# Patient Record
Sex: FEMALE | Race: WHITE | NOT HISPANIC OR LATINO | Employment: OTHER | URBAN - METROPOLITAN AREA
[De-identification: names, ages, dates, MRNs, and addresses within clinical notes are randomized per-mention and may not be internally consistent; named-entity substitution may affect disease eponyms.]

---

## 2023-03-14 DIAGNOSIS — K51.90 ULCERATIVE COLITIS WITHOUT COMPLICATIONS, UNSPECIFIED LOCATION (MULTI): ICD-10-CM

## 2023-03-14 PROBLEM — R79.89 ABNORMAL TSH: Status: ACTIVE | Noted: 2023-03-14

## 2023-03-14 PROBLEM — S60.211A CONTUSION OF RIGHT WRIST: Status: ACTIVE | Noted: 2023-03-14

## 2023-03-14 PROBLEM — M25.561 RIGHT KNEE PAIN: Status: ACTIVE | Noted: 2023-03-14

## 2023-03-14 PROBLEM — G54.2 CERVICAL NEUROPATHY: Status: ACTIVE | Noted: 2023-03-14

## 2023-03-14 PROBLEM — H81.10 BENIGN POSITIONAL VERTIGO: Status: ACTIVE | Noted: 2023-03-14

## 2023-03-14 PROBLEM — H52.00 HYPEROPIA: Status: ACTIVE | Noted: 2023-03-14

## 2023-03-14 PROBLEM — G47.33 OBSTRUCTIVE SLEEP APNEA: Status: ACTIVE | Noted: 2023-03-14

## 2023-03-14 PROBLEM — R60.9 EDEMA: Status: ACTIVE | Noted: 2023-03-14

## 2023-03-14 PROBLEM — F41.9 ANXIETY DISORDER: Status: ACTIVE | Noted: 2023-03-14

## 2023-03-14 PROBLEM — M25.461 EFFUSION OF RIGHT KNEE: Status: ACTIVE | Noted: 2023-03-14

## 2023-03-14 PROBLEM — H43.812 PVD (POSTERIOR VITREOUS DETACHMENT), LEFT EYE: Status: ACTIVE | Noted: 2023-03-14

## 2023-03-14 PROBLEM — E78.5 HYPERLIPEMIA: Status: ACTIVE | Noted: 2023-03-14

## 2023-03-14 PROBLEM — F33.9 MAJOR DEPRESSION, RECURRENT (CMS-HCC): Status: ACTIVE | Noted: 2023-03-14

## 2023-03-14 PROBLEM — Z96.652 STATUS POST REVISION OF TOTAL REPLACEMENT OF LEFT KNEE: Status: ACTIVE | Noted: 2023-03-14

## 2023-03-14 PROBLEM — H25.812 COMBINED FORM OF AGE-RELATED CATARACT, LEFT EYE: Status: ACTIVE | Noted: 2023-03-14

## 2023-03-14 PROBLEM — M54.50 LUMBAR PAIN: Status: ACTIVE | Noted: 2023-03-14

## 2023-03-14 PROBLEM — G43.909 MIGRAINE HEADACHE: Status: ACTIVE | Noted: 2023-03-14

## 2023-03-14 PROBLEM — M25.511 RIGHT SHOULDER PAIN: Status: ACTIVE | Noted: 2023-03-14

## 2023-03-14 PROBLEM — I67.1 UNRUPTURED CEREBRAL ANEURYSM (HHS-HCC): Status: ACTIVE | Noted: 2023-03-14

## 2023-03-14 PROBLEM — N18.30 STAGE 3 CHRONIC KIDNEY DISEASE (MULTI): Status: ACTIVE | Noted: 2023-03-14

## 2023-03-14 PROBLEM — E87.6 HYPOKALEMIA: Status: ACTIVE | Noted: 2023-03-14

## 2023-03-14 PROBLEM — R31.9 HEMATURIA: Status: ACTIVE | Noted: 2023-03-14

## 2023-03-14 PROBLEM — C67.2 MALIGNANT NEOPLASM OF LATERAL WALL OF URINARY BLADDER (MULTI): Status: ACTIVE | Noted: 2023-03-14

## 2023-03-14 PROBLEM — Z96.659 LOOSENING OF KNEE JOINT PROSTHESIS (CMS-HCC): Status: ACTIVE | Noted: 2023-03-14

## 2023-03-14 PROBLEM — H52.10 MYOPIA WITH PRESBYOPIA: Status: ACTIVE | Noted: 2023-03-14

## 2023-03-14 PROBLEM — M25.50 ARTHRALGIA OF MULTIPLE JOINTS: Status: ACTIVE | Noted: 2023-03-14

## 2023-03-14 PROBLEM — H69.93 DYSFUNCTION OF BOTH EUSTACHIAN TUBES: Status: ACTIVE | Noted: 2023-03-14

## 2023-03-14 PROBLEM — T84.59XA: Status: ACTIVE | Noted: 2023-03-14

## 2023-03-14 PROBLEM — H35.369 MACULAR DRUSEN: Status: ACTIVE | Noted: 2023-03-14

## 2023-03-14 PROBLEM — N39.0 ACUTE UTI: Status: ACTIVE | Noted: 2023-03-14

## 2023-03-14 PROBLEM — U09.9 POST-COVID-19 CONDITION: Status: ACTIVE | Noted: 2023-03-14

## 2023-03-14 PROBLEM — T14.8XXA SKIN AVULSION: Status: ACTIVE | Noted: 2023-03-14

## 2023-03-14 PROBLEM — M17.9 OSTEOARTHRITIS OF KNEE: Status: ACTIVE | Noted: 2023-03-14

## 2023-03-14 PROBLEM — I11.0: Status: ACTIVE | Noted: 2023-03-14

## 2023-03-14 PROBLEM — Z96.659: Status: ACTIVE | Noted: 2023-03-14

## 2023-03-14 PROBLEM — Z96.1 PSEUDOPHAKIA OF RIGHT EYE: Status: ACTIVE | Noted: 2023-03-14

## 2023-03-14 PROBLEM — M19.011 ARTHRITIS OF RIGHT SHOULDER REGION: Status: ACTIVE | Noted: 2023-03-14

## 2023-03-14 PROBLEM — H52.4 MYOPIA WITH PRESBYOPIA: Status: ACTIVE | Noted: 2023-03-14

## 2023-03-14 PROBLEM — B37.0 THRUSH: Status: ACTIVE | Noted: 2023-03-14

## 2023-03-14 PROBLEM — E03.9 ADULT HYPOTHYROIDISM: Status: ACTIVE | Noted: 2023-03-14

## 2023-03-14 PROBLEM — J43.9 COPD (CHRONIC OBSTRUCTIVE PULMONARY DISEASE) WITH EMPHYSEMA (MULTI): Status: ACTIVE | Noted: 2023-03-14

## 2023-03-14 PROBLEM — R53.83 FATIGUE: Status: ACTIVE | Noted: 2023-03-14

## 2023-03-14 PROBLEM — T84.038A LOOSENING OF KNEE JOINT PROSTHESIS (CMS-HCC): Status: ACTIVE | Noted: 2023-03-14

## 2023-03-14 RX ORDER — POTASSIUM CHLORIDE 750 MG/1
1 CAPSULE, EXTENDED RELEASE ORAL DAILY
COMMUNITY
Start: 2022-10-07 | End: 2023-07-18 | Stop reason: SDUPTHER

## 2023-03-14 RX ORDER — CHOLECALCIFEROL (VITAMIN D3) 25 MCG
1 TABLET ORAL DAILY
COMMUNITY

## 2023-03-14 RX ORDER — GELATIN ABSORBABLE SPONGE 12-7 MM 12-7 MM
1 MISC TOPICAL ONCE
COMMUNITY
Start: 2021-03-20 | End: 2023-10-02 | Stop reason: ALTCHOICE

## 2023-03-14 RX ORDER — CLOPIDOGREL BISULFATE 75 MG/1
1 TABLET ORAL DAILY
COMMUNITY
Start: 2018-06-19 | End: 2023-07-18 | Stop reason: SDUPTHER

## 2023-03-14 RX ORDER — SUMATRIPTAN SUCCINATE 100 MG/1
100 TABLET ORAL ONCE AS NEEDED
Status: ON HOLD | COMMUNITY
Start: 2020-05-07 | End: 2023-12-18 | Stop reason: ENTERED-IN-ERROR

## 2023-03-14 RX ORDER — LOPERAMIDE HCL 2 MG
2 TABLET ORAL AS NEEDED
COMMUNITY
Start: 2022-06-27

## 2023-03-14 RX ORDER — LEVOTHYROXINE SODIUM 25 UG/1
1 TABLET ORAL DAILY
COMMUNITY
Start: 2022-06-17 | End: 2023-07-18 | Stop reason: SDUPTHER

## 2023-03-14 RX ORDER — ATORVASTATIN CALCIUM 40 MG/1
1 TABLET, FILM COATED ORAL DAILY
COMMUNITY
Start: 2018-06-19 | End: 2023-07-18 | Stop reason: SDUPTHER

## 2023-03-14 RX ORDER — SULFASALAZINE 500 MG/1
500 TABLET ORAL 2 TIMES DAILY
Qty: 180 TABLET | Refills: 1 | Status: SHIPPED | OUTPATIENT
Start: 2023-03-14 | End: 2023-07-18 | Stop reason: SDUPTHER

## 2023-03-14 RX ORDER — FUROSEMIDE 40 MG/1
1 TABLET ORAL DAILY
COMMUNITY
Start: 2014-01-03 | End: 2023-07-18 | Stop reason: SDUPTHER

## 2023-03-14 RX ORDER — CITALOPRAM 20 MG/1
1 TABLET, FILM COATED ORAL DAILY
COMMUNITY
Start: 2013-10-24 | End: 2023-07-18 | Stop reason: SDUPTHER

## 2023-03-14 RX ORDER — BUPROPION HYDROCHLORIDE 300 MG/1
300 TABLET ORAL EVERY MORNING
COMMUNITY
Start: 2013-11-26 | End: 2023-07-18 | Stop reason: SDUPTHER

## 2023-03-14 RX ORDER — BUSPIRONE HYDROCHLORIDE 10 MG/1
1 TABLET ORAL 3 TIMES DAILY
Status: ON HOLD | COMMUNITY
Start: 2021-12-08 | End: 2023-12-18 | Stop reason: ENTERED-IN-ERROR

## 2023-03-14 RX ORDER — SULFASALAZINE 500 MG/1
500 TABLET ORAL 2 TIMES DAILY
COMMUNITY
End: 2023-03-14 | Stop reason: SDUPTHER

## 2023-06-28 ENCOUNTER — TELEPHONE (OUTPATIENT)
Dept: PRIMARY CARE | Facility: CLINIC | Age: 68
End: 2023-06-28

## 2023-07-18 ENCOUNTER — OFFICE VISIT (OUTPATIENT)
Dept: PRIMARY CARE | Facility: CLINIC | Age: 68
End: 2023-07-18
Payer: MEDICARE

## 2023-07-18 VITALS
HEIGHT: 66 IN | WEIGHT: 138 LBS | BODY MASS INDEX: 22.18 KG/M2 | OXYGEN SATURATION: 96 % | SYSTOLIC BLOOD PRESSURE: 120 MMHG | HEART RATE: 76 BPM | DIASTOLIC BLOOD PRESSURE: 80 MMHG

## 2023-07-18 DIAGNOSIS — F41.1 GENERALIZED ANXIETY DISORDER: ICD-10-CM

## 2023-07-18 DIAGNOSIS — N18.31 STAGE 3A CHRONIC KIDNEY DISEASE (MULTI): ICD-10-CM

## 2023-07-18 DIAGNOSIS — E55.9 VITAMIN D DEFICIENCY: ICD-10-CM

## 2023-07-18 DIAGNOSIS — E03.9 ADULT HYPOTHYROIDISM: ICD-10-CM

## 2023-07-18 DIAGNOSIS — K51.90 ULCERATIVE COLITIS WITHOUT COMPLICATIONS, UNSPECIFIED LOCATION (MULTI): ICD-10-CM

## 2023-07-18 DIAGNOSIS — J43.9 PULMONARY EMPHYSEMA, UNSPECIFIED EMPHYSEMA TYPE (MULTI): Primary | ICD-10-CM

## 2023-07-18 DIAGNOSIS — K51.819 OTHER ULCERATIVE COLITIS WITH COMPLICATION (MULTI): ICD-10-CM

## 2023-07-18 DIAGNOSIS — Z78.0 ASYMPTOMATIC MENOPAUSAL STATE: ICD-10-CM

## 2023-07-18 DIAGNOSIS — Z00.00 MEDICARE ANNUAL WELLNESS VISIT, SUBSEQUENT: ICD-10-CM

## 2023-07-18 DIAGNOSIS — Z23 NEED FOR VACCINATION: ICD-10-CM

## 2023-07-18 DIAGNOSIS — Z12.31 ENCOUNTER FOR SCREENING MAMMOGRAM FOR BREAST CANCER: ICD-10-CM

## 2023-07-18 DIAGNOSIS — E78.2 MIXED HYPERLIPIDEMIA: ICD-10-CM

## 2023-07-18 DIAGNOSIS — I11.0 HYPERTENSIVE HEART DISEASE WITH CONGESTIVE HEART FAILURE, UNSPECIFIED HEART FAILURE TYPE (MULTI): ICD-10-CM

## 2023-07-18 DIAGNOSIS — F33.41 RECURRENT MAJOR DEPRESSIVE DISORDER, IN PARTIAL REMISSION (CMS-HCC): ICD-10-CM

## 2023-07-18 PROCEDURE — 4004F PT TOBACCO SCREEN RCVD TLK: CPT | Performed by: FAMILY MEDICINE

## 2023-07-18 PROCEDURE — 1159F MED LIST DOCD IN RCRD: CPT | Performed by: FAMILY MEDICINE

## 2023-07-18 PROCEDURE — 1170F FXNL STATUS ASSESSED: CPT | Performed by: FAMILY MEDICINE

## 2023-07-18 PROCEDURE — 99214 OFFICE O/P EST MOD 30 MIN: CPT | Performed by: FAMILY MEDICINE

## 2023-07-18 PROCEDURE — 1125F AMNT PAIN NOTED PAIN PRSNT: CPT | Performed by: FAMILY MEDICINE

## 2023-07-18 PROCEDURE — 1160F RVW MEDS BY RX/DR IN RCRD: CPT | Performed by: FAMILY MEDICINE

## 2023-07-18 RX ORDER — CITALOPRAM 20 MG/1
20 TABLET, FILM COATED ORAL DAILY
Qty: 90 TABLET | Refills: 3 | Status: SHIPPED | OUTPATIENT
Start: 2023-07-18 | End: 2023-08-08

## 2023-07-18 RX ORDER — LEVOTHYROXINE SODIUM 25 UG/1
25 TABLET ORAL DAILY
Qty: 90 TABLET | Refills: 1 | Status: SHIPPED | OUTPATIENT
Start: 2023-07-18 | End: 2024-03-15 | Stop reason: SDUPTHER

## 2023-07-18 RX ORDER — FUROSEMIDE 40 MG/1
40 TABLET ORAL DAILY
Qty: 90 TABLET | Refills: 1 | Status: SHIPPED | OUTPATIENT
Start: 2023-07-18 | End: 2023-09-12 | Stop reason: DRUGHIGH

## 2023-07-18 RX ORDER — BUPROPION HYDROCHLORIDE 300 MG/1
300 TABLET ORAL EVERY MORNING
Qty: 90 TABLET | Refills: 3 | Status: SHIPPED | OUTPATIENT
Start: 2023-07-18

## 2023-07-18 RX ORDER — SULFASALAZINE 500 MG/1
500 TABLET ORAL 2 TIMES DAILY
Qty: 180 TABLET | Refills: 1 | Status: SHIPPED | OUTPATIENT
Start: 2023-07-18 | End: 2023-10-02 | Stop reason: SDUPTHER

## 2023-07-18 RX ORDER — CLOPIDOGREL BISULFATE 75 MG/1
75 TABLET ORAL DAILY
Qty: 90 TABLET | Refills: 1 | Status: SHIPPED | OUTPATIENT
Start: 2023-07-18 | End: 2024-03-15 | Stop reason: SDUPTHER

## 2023-07-18 RX ORDER — POTASSIUM CHLORIDE 750 MG/1
10 CAPSULE, EXTENDED RELEASE ORAL DAILY
Qty: 90 CAPSULE | Refills: 1 | Status: SHIPPED | OUTPATIENT
Start: 2023-07-18 | End: 2023-09-12 | Stop reason: DRUGHIGH

## 2023-07-18 RX ORDER — ATORVASTATIN CALCIUM 40 MG/1
40 TABLET, FILM COATED ORAL DAILY
Qty: 90 TABLET | Refills: 3 | Status: SHIPPED | OUTPATIENT
Start: 2023-07-18

## 2023-07-18 ASSESSMENT — ANXIETY QUESTIONNAIRES
1. FEELING NERVOUS, ANXIOUS, OR ON EDGE: MORE THAN HALF THE DAYS
GAD7 TOTAL SCORE: 16
7. FEELING AFRAID AS IF SOMETHING AWFUL MIGHT HAPPEN: MORE THAN HALF THE DAYS
2. NOT BEING ABLE TO STOP OR CONTROL WORRYING: SEVERAL DAYS
6. BECOMING EASILY ANNOYED OR IRRITABLE: NEARLY EVERY DAY
3. WORRYING TOO MUCH ABOUT DIFFERENT THINGS: NEARLY EVERY DAY
4. TROUBLE RELAXING: NEARLY EVERY DAY
5. BEING SO RESTLESS THAT IT IS HARD TO SIT STILL: MORE THAN HALF THE DAYS
IF YOU CHECKED OFF ANY PROBLEMS ON THIS QUESTIONNAIRE, HOW DIFFICULT HAVE THESE PROBLEMS MADE IT FOR YOU TO DO YOUR WORK, TAKE CARE OF THINGS AT HOME, OR GET ALONG WITH OTHER PEOPLE: VERY DIFFICULT

## 2023-07-18 ASSESSMENT — PATIENT HEALTH QUESTIONNAIRE - PHQ9
9. THOUGHTS THAT YOU WOULD BE BETTER OFF DEAD, OR OF HURTING YOURSELF: NOT AT ALL
6. FEELING BAD ABOUT YOURSELF - OR THAT YOU ARE A FAILURE OR HAVE LET YOURSELF OR YOUR FAMILY DOWN: MORE THAN HALF THE DAYS
SUM OF ALL RESPONSES TO PHQ QUESTIONS 1-9: 17
2. FEELING DOWN, DEPRESSED OR HOPELESS: NEARLY EVERY DAY
SUM OF ALL RESPONSES TO PHQ9 QUESTIONS 1 AND 2: 5
7. TROUBLE CONCENTRATING ON THINGS, SUCH AS READING THE NEWSPAPER OR WATCHING TELEVISION: NEARLY EVERY DAY
8. MOVING OR SPEAKING SO SLOWLY THAT OTHER PEOPLE COULD HAVE NOTICED. OR THE OPPOSITE, BEING SO FIGETY OR RESTLESS THAT YOU HAVE BEEN MOVING AROUND A LOT MORE THAN USUAL: NOT AT ALL
10. IF YOU CHECKED OFF ANY PROBLEMS, HOW DIFFICULT HAVE THESE PROBLEMS MADE IT FOR YOU TO DO YOUR WORK, TAKE CARE OF THINGS AT HOME, OR GET ALONG WITH OTHER PEOPLE: SOMEWHAT DIFFICULT
4. FEELING TIRED OR HAVING LITTLE ENERGY: NEARLY EVERY DAY
5. POOR APPETITE OR OVEREATING: MORE THAN HALF THE DAYS
1. LITTLE INTEREST OR PLEASURE IN DOING THINGS: MORE THAN HALF THE DAYS
3. TROUBLE FALLING OR STAYING ASLEEP OR SLEEPING TOO MUCH: MORE THAN HALF THE DAYS

## 2023-07-18 ASSESSMENT — ACTIVITIES OF DAILY LIVING (ADL)
DRESSING: INDEPENDENT
GROCERY_SHOPPING: INDEPENDENT
DOING_HOUSEWORK: INDEPENDENT
MANAGING_FINANCES: INDEPENDENT
DOING_HOUSEWORK: INDEPENDENT
TAKING_MEDICATION: INDEPENDENT
TAKING_MEDICATION: INDEPENDENT
DRESSING: INDEPENDENT
MANAGING_FINANCES: INDEPENDENT
BATHING: INDEPENDENT
GROCERY_SHOPPING: INDEPENDENT
BATHING: INDEPENDENT

## 2023-07-18 ASSESSMENT — ENCOUNTER SYMPTOMS
LOSS OF SENSATION IN FEET: 0
OCCASIONAL FEELINGS OF UNSTEADINESS: 0
DEPRESSION: 0

## 2023-07-18 NOTE — PROGRESS NOTES
"Subjective   Reason for Visit: Julissa New is an 67 y.o. female here for a Medicare Wellness visit.     Past Medical, Surgical, and Family History reviewed and updated in chart.    Reviewed all medications by prescribing practitioner or clinical pharmacist (such as prescriptions, OTCs, herbal therapies and supplements) and documented in the medical record.    HPI    SOC Hx:   Lives with her sister and great neice  Tobacco Use: High Risk (7/18/2023)    Patient History     Smoking Tobacco Use: Every Day     Smokeless Tobacco Use: Never     Passive Exposure: Not on file     Alcohol Use: Not on file       DIET: general    EXERCISE: The patient does not participate in regular exercise at present.    ELIMINATION: Bowel urgency fluctuating difficulty getting to the bathroom  Not  black or tarry    No urinary issuesnone    SLEEP: no issues disturbed wakes frequently checking on her teenage neice    MOODS: stress manageable despite family stressors  PHQ-9: Patient Health Questionnaire-9 Score: 17    DICK-7: DICK-7 Total Score: 16     OB/GYN: LMP: No LMP recorded.  Menopause for many years  Hot flashes       Patient Care Team:  Yarely Mitchell DO as PCP - General  Yarely Mitchell DO as PCP - MSSP ACO Attributed Provider     Review of Systems    Objective   Vitals:  /80 (BP Location: Left arm, Patient Position: Sitting, BP Cuff Size: Adult)   Pulse 76   Ht 1.676 m (5' 6\")   Wt 62.6 kg (138 lb)   SpO2 96%   BMI 22.27 kg/m²       Physical Exam  Constitutional:       General: She is not in acute distress.     Appearance: Normal appearance. She is not ill-appearing.   HENT:      Head: Normocephalic and atraumatic.   Neck:      Vascular: No carotid bruit.   Cardiovascular:      Rate and Rhythm: Normal rate and regular rhythm.      Pulses: Normal pulses.      Heart sounds: Normal heart sounds. No murmur heard.     No gallop.   Pulmonary:      Effort: Pulmonary effort is normal.      Breath sounds: Normal breath " sounds. No wheezing, rhonchi or rales.   Musculoskeletal:      Cervical back: Normal range of motion and neck supple. No rigidity or tenderness.   Lymphadenopathy:      Cervical: No cervical adenopathy.   Skin:     General: Skin is warm and dry.   Neurological:      Mental Status: She is alert.   Psychiatric:         Mood and Affect: Mood normal.         Behavior: Behavior normal.         Assessment/Plan   Problem List Items Addressed This Visit       Adult hypothyroidism    Current Assessment & Plan     Stable on current Levothyroxine dosage.  Continue at current dosage.         Relevant Medications    levothyroxine (LevoxyL) 25 mcg tablet    Other Relevant Orders    TSH with reflex to Free T4 if abnormal    Anxiety disorder    Relevant Medications    citalopram (CeleXA) 20 mg tablet    buPROPion XL (Wellbutrin XL) 300 mg 24 hr tablet    COPD (chronic obstructive pulmonary disease) with emphysema (CMS/HCC)    Hyperlipemia    Current Assessment & Plan     Stable - continue with medications at current dosage.         Relevant Medications    atorvastatin (Lipitor) 40 mg tablet    Other Relevant Orders    Comprehensive Metabolic Panel    Lipid Panel    Hypertensive CHF (congestive heart failure) (CMS/HCC)    Current Assessment & Plan     Stable - weight loss continues but no SOB or chest pain.  Continue with current medications at current dosage.  Follow at least every 6 months.         Relevant Medications    clopidogrel (Plavix) 75 mg tablet    potassium chloride ER (Micro-K) 10 mEq ER capsule    furosemide (Lasix) 40 mg tablet    Other Relevant Orders    CBC    Comprehensive Metabolic Panel    Major depression, recurrent (CMS/HCC)    Current Assessment & Plan     Concerning exacerbation of major depression  Encouraging to look into senior center activities and /or Holiness groups with her local parish.  Discussed behavioral collaborative care referral.         Relevant Orders    Follow Up In Advanced Primary Care -  Behavioral Health Collaborative Care CoCM    Stage 3 chronic kidney disease (CMS/[facility])    Current Assessment & Plan     Rechecking labs  Avoid NSAIDs as much as possible, dietary changes discussed.         Relevant Orders    Comprehensive Metabolic Panel    Ulcerative colitis (CMS/[facility])    Current Assessment & Plan     Flared - increasing Sulfasalazine dosage.  Continue with dietary modifications.  May need follow up with GI if not improving.         Relevant Medications    sulfaSALAzine (Azulfidine) 500 mg tablet     Other Visit Diagnoses       Need for vaccination    -  Primary    Relevant Medications    zoster vaccine-recombinant adjuvanted (Shingrix) 50 mcg/0.5 mL vaccine    Asymptomatic menopausal state        Relevant Orders    XR DEXA bone density    Encounter for screening mammogram for breast cancer        Relevant Orders    BI mammo bilateral screening tomosynthesis    Medicare annual wellness visit, subsequent        Vitamin D deficiency        Relevant Orders    Vitamin D, Total          I have discussed the collaborative care model for this patient's behavioral health care.  Written detailed information and identifying the members of this care team was provided to patient.  They give permission for the Behavioral Health Manager (BHM) and psychiatric consultant to be included in their care with my continued primary management.  Patient made aware that services provided as part of the Collaborative Care Model are subject to insurance billing.        Patient was identified as a fall risk. Risk prevention instructions provided.    Medicare Wellness Billing Compliance Satisfied    *This is a visual tool to show completion of required items on the day of the visit. Green checks will only appear on the date of visit.    Review all medications by prescribing practitioner or clinical pharmacist (such as prescriptions, OTCs, herbal therapies and supplements) documented in the medical record    Past Medical,  Surgical, and Family History reviewed and updated in chart    Tobacco Use Reviewed    Alcohol Use Reviewed    Illicit Drug Use Reviewed    PHQ2/9    Falls in Last Year Reviewed    Home Safety Risk Factors Reviewed    Cognitive Impairment Reviewed    Patient Self Assessment and Health Status    Current Diet Reviewed    Exercise Frequency    ADL - Hearing Impairment    ADL - Bathing    ADL - Dressing    ADL - Walks in Home    IADL - Managing Finances    IADL - Grocery Shopping    IADL - Taking Medications    IADL - Doing Housework      FOLLOW U P 3-4 MONTHS PENDING REVIEW OF LABS AND COLLABORATIVE CARE TEAM.

## 2023-07-19 NOTE — ASSESSMENT & PLAN NOTE
Flared - increasing Sulfasalazine dosage.  Continue with dietary modifications.  May need follow up with GI if not improving.

## 2023-07-19 NOTE — ASSESSMENT & PLAN NOTE
Stable - weight loss continues but no SOB or chest pain.  Continue with current medications at current dosage.  Follow at least every 6 months.

## 2023-07-19 NOTE — ASSESSMENT & PLAN NOTE
Concerning exacerbation of major depression  Encouraging to look into senior center activities and /or Holiness groups with her local parish.  Discussed behavioral collaborative care referral.

## 2023-08-02 ENCOUNTER — SOCIAL WORK (OUTPATIENT)
Dept: PRIMARY CARE | Facility: CLINIC | Age: 68
End: 2023-08-02
Payer: MEDICARE

## 2023-08-02 ENCOUNTER — DOCUMENTATION (OUTPATIENT)
Dept: BEHAVIORAL HEALTH | Facility: CLINIC | Age: 68
End: 2023-08-02

## 2023-08-02 DIAGNOSIS — F33.41 RECURRENT MAJOR DEPRESSIVE DISORDER, IN PARTIAL REMISSION (CMS-HCC): ICD-10-CM

## 2023-08-02 ASSESSMENT — PATIENT HEALTH QUESTIONNAIRE - PHQ9
1. LITTLE INTEREST OR PLEASURE IN DOING THINGS: MORE THAN HALF THE DAYS
10. IF YOU CHECKED OFF ANY PROBLEMS, HOW DIFFICULT HAVE THESE PROBLEMS MADE IT FOR YOU TO DO YOUR WORK, TAKE CARE OF THINGS AT HOME, OR GET ALONG WITH OTHER PEOPLE: SOMEWHAT DIFFICULT
2. FEELING DOWN, DEPRESSED OR HOPELESS: NEARLY EVERY DAY
4. FEELING TIRED OR HAVING LITTLE ENERGY: NEARLY EVERY DAY
8. MOVING OR SPEAKING SO SLOWLY THAT OTHER PEOPLE COULD HAVE NOTICED. OR THE OPPOSITE, BEING SO FIGETY OR RESTLESS THAT YOU HAVE BEEN MOVING AROUND A LOT MORE THAN USUAL: MORE THAN HALF THE DAYS
SUM OF ALL RESPONSES TO PHQ9 QUESTIONS 1 & 2: 5
3. TROUBLE FALLING OR STAYING ASLEEP: SEVERAL DAYS
6. FEELING BAD ABOUT YOURSELF - OR THAT YOU ARE A FAILURE OR HAVE LET YOURSELF OR YOUR FAMILY DOWN: NOT AT ALL
5. POOR APPETITE OR OVEREATING: SEVERAL DAYS
9. THOUGHTS THAT YOU WOULD BE BETTER OFF DEAD, OR OF HURTING YOURSELF: NOT AT ALL
SUM OF ALL RESPONSES TO PHQ QUESTIONS 1-9: 14
7. TROUBLE CONCENTRATING ON THINGS, SUCH AS READING THE NEWSPAPER OR WATCHING TELEVISION: MORE THAN HALF THE DAYS
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
## 2023-08-02 NOTE — Clinical Note
NHAN regarding my recommendations based on our team's discussion in collaborative care; in brief I'd suggest switching her from citalopram 20mg to escitalopram 10mg. I'd also recommend checking to see if there is anything addition to do to optimize management of her ulcerative colitis and VERO (given their overlap with her psychiatric symptoms) and see if she is interested in help with tobacco cessation.   Please feel free to reach out with any questions / comments / feedback / concerns.   Davidson Forbes

## 2023-08-03 ENCOUNTER — TELEPHONE (OUTPATIENT)
Dept: PRIMARY CARE | Facility: CLINIC | Age: 68
End: 2023-08-03
Payer: MEDICARE
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
## 2023-08-08 ENCOUNTER — TELEPHONE (OUTPATIENT)
Dept: PRIMARY CARE | Facility: CLINIC | Age: 68
End: 2023-08-08

## 2023-08-08 ENCOUNTER — SOCIAL WORK (OUTPATIENT)
Dept: PRIMARY CARE | Facility: CLINIC | Age: 68
End: 2023-08-08
Payer: MEDICARE

## 2023-08-08 DIAGNOSIS — F41.9 ANXIETY DISORDER, UNSPECIFIED TYPE: Primary | ICD-10-CM

## 2023-08-08 DIAGNOSIS — F33.2 SEVERE EPISODE OF RECURRENT MAJOR DEPRESSIVE DISORDER, WITHOUT PSYCHOTIC FEATURES (MULTI): ICD-10-CM

## 2023-08-08 RX ORDER — ESCITALOPRAM OXALATE 10 MG/1
10 TABLET ORAL DAILY
Qty: 30 TABLET | Refills: 1 | Status: SHIPPED | OUTPATIENT
Start: 2023-08-08 | End: 2023-12-27

## 2023-08-08 ASSESSMENT — ANXIETY QUESTIONNAIRES
4. TROUBLE RELAXING: NEARLY EVERY DAY
1. FEELING NERVOUS, ANXIOUS, OR ON EDGE: NEARLY EVERY DAY
3. WORRYING TOO MUCH ABOUT DIFFERENT THINGS: SEVERAL DAYS
7. FEELING AFRAID AS IF SOMETHING AWFUL MIGHT HAPPEN: NOT AT ALL
IF YOU CHECKED OFF ANY PROBLEMS ON THIS QUESTIONNAIRE, HOW DIFFICULT HAVE THESE PROBLEMS MADE IT FOR YOU TO DO YOUR WORK, TAKE CARE OF THINGS AT HOME, OR GET ALONG WITH OTHER PEOPLE: SOMEWHAT DIFFICULT
5. BEING SO RESTLESS THAT IT IS HARD TO SIT STILL: SEVERAL DAYS
2. NOT BEING ABLE TO STOP OR CONTROL WORRYING: MORE THAN HALF THE DAYS
6. BECOMING EASILY ANNOYED OR IRRITABLE: NEARLY EVERY DAY
GAD7 TOTAL SCORE: 13

## 2023-08-08 ASSESSMENT — PATIENT HEALTH QUESTIONNAIRE - PHQ9
6. FEELING BAD ABOUT YOURSELF - OR THAT YOU ARE A FAILURE OR HAVE LET YOURSELF OR YOUR FAMILY DOWN: NOT AT ALL
2. FEELING DOWN, DEPRESSED OR HOPELESS: MORE THAN HALF THE DAYS
5. POOR APPETITE OR OVEREATING: MORE THAN HALF THE DAYS
10. IF YOU CHECKED OFF ANY PROBLEMS, HOW DIFFICULT HAVE THESE PROBLEMS MADE IT FOR YOU TO DO YOUR WORK, TAKE CARE OF THINGS AT HOME, OR GET ALONG WITH OTHER PEOPLE: NOT DIFFICULT AT ALL
SUM OF ALL RESPONSES TO PHQ QUESTIONS 1-9: 16
3. TROUBLE FALLING OR STAYING ASLEEP: MORE THAN HALF THE DAYS
9. THOUGHTS THAT YOU WOULD BE BETTER OFF DEAD, OR OF HURTING YOURSELF: NOT AT ALL
4. FEELING TIRED OR HAVING LITTLE ENERGY: NEARLY EVERY DAY
7. TROUBLE CONCENTRATING ON THINGS, SUCH AS READING THE NEWSPAPER OR WATCHING TELEVISION: MORE THAN HALF THE DAYS
1. LITTLE INTEREST OR PLEASURE IN DOING THINGS: NEARLY EVERY DAY
8. MOVING OR SPEAKING SO SLOWLY THAT OTHER PEOPLE COULD HAVE NOTICED. OR THE OPPOSITE, BEING SO FIGETY OR RESTLESS THAT YOU HAVE BEEN MOVING AROUND A LOT MORE THAN USUAL: MORE THAN HALF THE DAYS
SUM OF ALL RESPONSES TO PHQ9 QUESTIONS 1 & 2: 5

## 2023-08-08 NOTE — PROGRESS NOTES
I met with Lisa today to go over Dr. Bautista's recommendations.  She is willing to try switching from citalopram to escitalopram 10mg per Dr. Bautista's note.  Do you want to see her prior to switching or is it ok to start and then have her follow up with you?

## 2023-08-08 NOTE — PROGRESS NOTES
Collaborative Care (CoCM)  Progress Note    Type of Interaction: In Office    Start Time: 3:02 PM    End Time: 3:30 PM    Appointment: Scheduled    Reason for Visit:   Chief Complaint   Patient presents with    Follow-up      Interval History / Patient Symptoms:     Patient Health Questionnaire-9 Score: 16 (8/8/2023  3:30 PM)  DICK-7 Total Score: 13 (8/8/2023  3:24 PM)        Interventions Provided: Treatment Planning      Progress Made: N/A    Response to Intervention: Reports she cannot taste or smell since having COVID, but does not have any other long lasting symptoms or concerns.    Reviewed psych recommendations, including changing citalopram to escitalopram.  Patient is agreeable to switching to escitalopram 10mg.  Side effects reviewed.  Reviewed patient's expectations of Collaborative care, including goals.  Patient states she feels she handles anger well and can see situations in grey.  Will explore goals more during next appointment.        Plan:   M to communicate with PCP regarding medication recommendations   BHM to communicate with patient regarding prescription and making a follow up with PCP after 1 month.      Follow Up / Next Appointment: Next appointment: 08/22/23

## 2023-08-22 ENCOUNTER — APPOINTMENT (OUTPATIENT)
Dept: PRIMARY CARE | Facility: CLINIC | Age: 68
End: 2023-08-22
Payer: MEDICARE

## 2023-08-29 RX ORDER — NAPROXEN 500 MG/1
500 TABLET ORAL 2 TIMES DAILY PRN
COMMUNITY

## 2023-09-05 ENCOUNTER — DOCUMENTATION (OUTPATIENT)
Dept: PRIMARY CARE | Facility: CLINIC | Age: 68
End: 2023-09-05
Payer: MEDICARE

## 2023-09-05 DIAGNOSIS — F33.1 MODERATE EPISODE OF RECURRENT MAJOR DEPRESSIVE DISORDER (MULTI): Primary | ICD-10-CM

## 2023-09-05 PROCEDURE — 99494 1ST/SBSQ PSYC COLLAB CARE: CPT | Performed by: FAMILY MEDICINE

## 2023-09-05 PROCEDURE — 99492 1ST PSYC COLLAB CARE MGMT: CPT | Performed by: FAMILY MEDICINE

## 2023-09-12 ENCOUNTER — OFFICE VISIT (OUTPATIENT)
Dept: PRIMARY CARE | Facility: CLINIC | Age: 68
End: 2023-09-12
Payer: COMMERCIAL

## 2023-09-12 VITALS
HEART RATE: 61 BPM | BODY MASS INDEX: 21.56 KG/M2 | OXYGEN SATURATION: 95 % | RESPIRATION RATE: 16 BRPM | SYSTOLIC BLOOD PRESSURE: 110 MMHG | WEIGHT: 133.6 LBS | DIASTOLIC BLOOD PRESSURE: 64 MMHG

## 2023-09-12 DIAGNOSIS — Z23 NEED FOR VACCINATION: ICD-10-CM

## 2023-09-12 DIAGNOSIS — Z12.11 COLON CANCER SCREENING: ICD-10-CM

## 2023-09-12 DIAGNOSIS — I11.0 HYPERTENSIVE HEART DISEASE WITH CONGESTIVE HEART FAILURE, UNSPECIFIED HEART FAILURE TYPE (MULTI): ICD-10-CM

## 2023-09-12 DIAGNOSIS — R91.8 LUNG NODULES: ICD-10-CM

## 2023-09-12 DIAGNOSIS — Z00.00 MEDICARE ANNUAL WELLNESS VISIT, SUBSEQUENT: Primary | ICD-10-CM

## 2023-09-12 DIAGNOSIS — K51.819 OTHER ULCERATIVE COLITIS WITH COMPLICATION (MULTI): ICD-10-CM

## 2023-09-12 DIAGNOSIS — N18.31 STAGE 3A CHRONIC KIDNEY DISEASE (MULTI): ICD-10-CM

## 2023-09-12 DIAGNOSIS — C67.2 MALIGNANT NEOPLASM OF LATERAL WALL OF URINARY BLADDER (MULTI): ICD-10-CM

## 2023-09-12 DIAGNOSIS — I67.1 UNRUPTURED CEREBRAL ANEURYSM (HHS-HCC): ICD-10-CM

## 2023-09-12 PROCEDURE — 4004F PT TOBACCO SCREEN RCVD TLK: CPT | Performed by: FAMILY MEDICINE

## 2023-09-12 PROCEDURE — G0439 PPPS, SUBSEQ VISIT: HCPCS | Performed by: FAMILY MEDICINE

## 2023-09-12 PROCEDURE — 1160F RVW MEDS BY RX/DR IN RCRD: CPT | Performed by: FAMILY MEDICINE

## 2023-09-12 PROCEDURE — 1125F AMNT PAIN NOTED PAIN PRSNT: CPT | Performed by: FAMILY MEDICINE

## 2023-09-12 PROCEDURE — 1159F MED LIST DOCD IN RCRD: CPT | Performed by: FAMILY MEDICINE

## 2023-09-12 PROCEDURE — 99214 OFFICE O/P EST MOD 30 MIN: CPT | Performed by: FAMILY MEDICINE

## 2023-09-12 ASSESSMENT — PATIENT HEALTH QUESTIONNAIRE - PHQ9
5. POOR APPETITE OR OVEREATING: MORE THAN HALF THE DAYS
10. IF YOU CHECKED OFF ANY PROBLEMS, HOW DIFFICULT HAVE THESE PROBLEMS MADE IT FOR YOU TO DO YOUR WORK, TAKE CARE OF THINGS AT HOME, OR GET ALONG WITH OTHER PEOPLE: SOMEWHAT DIFFICULT
6. FEELING BAD ABOUT YOURSELF - OR THAT YOU ARE A FAILURE OR HAVE LET YOURSELF OR YOUR FAMILY DOWN: NOT AT ALL
8. MOVING OR SPEAKING SO SLOWLY THAT OTHER PEOPLE COULD HAVE NOTICED. OR THE OPPOSITE, BEING SO FIGETY OR RESTLESS THAT YOU HAVE BEEN MOVING AROUND A LOT MORE THAN USUAL: MORE THAN HALF THE DAYS
1. LITTLE INTEREST OR PLEASURE IN DOING THINGS: SEVERAL DAYS
7. TROUBLE CONCENTRATING ON THINGS, SUCH AS READING THE NEWSPAPER OR WATCHING TELEVISION: MORE THAN HALF THE DAYS
SUM OF ALL RESPONSES TO PHQ9 QUESTIONS 1 AND 2: 3
2. FEELING DOWN, DEPRESSED OR HOPELESS: MORE THAN HALF THE DAYS
9. THOUGHTS THAT YOU WOULD BE BETTER OFF DEAD, OR OF HURTING YOURSELF: NOT AT ALL
3. TROUBLE FALLING OR STAYING ASLEEP OR SLEEPING TOO MUCH: SEVERAL DAYS
SUM OF ALL RESPONSES TO PHQ QUESTIONS 1-9: 11
4. FEELING TIRED OR HAVING LITTLE ENERGY: SEVERAL DAYS

## 2023-09-12 ASSESSMENT — LIFESTYLE VARIABLES
HOW OFTEN DURING THE LAST YEAR HAVE YOU HAD A FEELING OF GUILT OR REMORSE AFTER DRINKING: MONTHLY
AUDIT-C TOTAL SCORE: 3
HAS A RELATIVE, FRIEND, DOCTOR, OR ANOTHER HEALTH PROFESSIONAL EXPRESSED CONCERN ABOUT YOUR DRINKING OR SUGGESTED YOU CUT DOWN: YES, DURING THE LAST YEAR
HOW OFTEN DURING THE LAST YEAR HAVE YOU BEEN UNABLE TO REMEMBER WHAT HAPPENED THE NIGHT BEFORE BECAUSE YOU HAD BEEN DRINKING: LESS THAN MONTHLY
HOW OFTEN DO YOU HAVE SIX OR MORE DRINKS ON ONE OCCASION: LESS THAN MONTHLY
HOW MANY STANDARD DRINKS CONTAINING ALCOHOL DO YOU HAVE ON A TYPICAL DAY: 1 OR 2
HOW OFTEN DURING THE LAST YEAR HAVE YOU FOUND THAT YOU WERE NOT ABLE TO STOP DRINKING ONCE YOU HAD STARTED: MONTHLY
SKIP TO QUESTIONS 9-10: 0
HOW OFTEN DURING THE LAST YEAR HAVE YOU NEEDED AN ALCOHOLIC DRINK FIRST THING IN THE MORNING TO GET YOURSELF GOING AFTER A NIGHT OF HEAVY DRINKING: NEVER
HOW OFTEN DO YOU HAVE A DRINK CONTAINING ALCOHOL: 2-4 TIMES A MONTH
HOW OFTEN DURING THE LAST YEAR HAVE YOU FAILED TO DO WHAT WAS NORMALLY EXPECTED FROM YOU BECAUSE OF DRINKING: MONTHLY
AUDIT TOTAL SCORE: 18
HAVE YOU OR SOMEONE ELSE BEEN INJURED AS A RESULT OF YOUR DRINKING: YES, DURING THE LAST YEAR

## 2023-09-12 ASSESSMENT — ANXIETY QUESTIONNAIRES
2. NOT BEING ABLE TO STOP OR CONTROL WORRYING: SEVERAL DAYS
5. BEING SO RESTLESS THAT IT IS HARD TO SIT STILL: NOT AT ALL
7. FEELING AFRAID AS IF SOMETHING AWFUL MIGHT HAPPEN: MORE THAN HALF THE DAYS
3. WORRYING TOO MUCH ABOUT DIFFERENT THINGS: NEARLY EVERY DAY
4. TROUBLE RELAXING: SEVERAL DAYS
1. FEELING NERVOUS, ANXIOUS, OR ON EDGE: MORE THAN HALF THE DAYS
6. BECOMING EASILY ANNOYED OR IRRITABLE: NEARLY EVERY DAY
GAD7 TOTAL SCORE: 12
IF YOU CHECKED OFF ANY PROBLEMS ON THIS QUESTIONNAIRE, HOW DIFFICULT HAVE THESE PROBLEMS MADE IT FOR YOU TO DO YOUR WORK, TAKE CARE OF THINGS AT HOME, OR GET ALONG WITH OTHER PEOPLE: SOMEWHAT DIFFICULT

## 2023-09-12 NOTE — PROGRESS NOTES
Subjective   Reason for Visit: Julissa New is an 67 y.o. female here for a Medicare Wellness visit.     Past Medical, Surgical, and Family History reviewed and updated in chart.    HTN: BP  controlled  Somewhat lightheaded with changes in position    Reviewed all medications by prescribing practitioner or clinical pharmacist (such as prescriptions, OTCs, herbal therapies and supplements) and documented in the medical record.    HPI    SOC Hx:   Tobacco Use: High Risk (9/12/2023)    Patient History     Smoking Tobacco Use: Every Day     Smokeless Tobacco Use: Never     Passive Exposure: Not on file     Alcohol Use: Heavy Drinker (9/12/2023)    AUDIT-C     Frequency of Alcohol Consumption: 2-4 times a month     Average Number of Drinks: 1 or 2     Frequency of Binge Drinking: Less than monthly       DIET: general    EXERCISE: The patient does not participate in regular exercise at present.    ELIMINATION: no constipation or diarrhea    No urinary issuesnone    SLEEP: no issues disturbed    MOODS: better on her current regimen  PHQ-9:  3  DICK-7: DICK-7 Total Score: 12 12    OB/GYN: LMP: No LMP recorded.  Menopause at 51 years  Last pap date: unknown  Abnormal pap? no    Patient Care Team:  Yarely Mitchell DO as PCP - General (Family Medicine)  Yarely Mitchell DO (Family Medicine)     Review of Systems    Objective   Vitals:  /64 (BP Location: Left arm, Patient Position: Sitting, BP Cuff Size: Adult)   Pulse 61   Resp 16   Wt 60.6 kg (133 lb 9.6 oz)   SpO2 95%   BMI 21.56 kg/m²       Physical Exam  Constitutional:       General: She is not in acute distress.     Comments: Cachetic appearing   HENT:      Head: Normocephalic.      Right Ear: Tympanic membrane normal.      Left Ear: Tympanic membrane normal.      Nose: Nose normal. No congestion or rhinorrhea.      Mouth/Throat:      Mouth: Mucous membranes are dry.   Eyes:      Extraocular Movements: Extraocular movements intact.      Pupils: Pupils  are equal, round, and reactive to light.   Neck:      Vascular: No carotid bruit.   Cardiovascular:      Rate and Rhythm: Normal rate and regular rhythm.      Pulses: Normal pulses.      Heart sounds: Normal heart sounds. No murmur heard.     No friction rub.   Pulmonary:      Effort: Pulmonary effort is normal.      Breath sounds: Normal breath sounds.   Abdominal:      General: Abdomen is flat. Bowel sounds are normal.   Musculoskeletal:         General: Normal range of motion.      Cervical back: Normal range of motion and neck supple. No tenderness.   Lymphadenopathy:      Cervical: No cervical adenopathy.   Skin:     General: Skin is warm.   Neurological:      General: No focal deficit present.      Mental Status: She is alert and oriented to person, place, and time.      Cranial Nerves: No cranial nerve deficit.      Gait: Gait normal.   Psychiatric:         Mood and Affect: Mood normal.         Behavior: Behavior normal.         Assessment/Plan   Problem List Items Addressed This Visit       Hypertensive CHF (congestive heart failure) (CMS/HCC)    Malignant neoplasm of lateral wall of urinary bladder (CMS/HCC)    Stage 3 chronic kidney disease (CMS/HCC)    Ulcerative colitis (CMS/HCC)    Unruptured cerebral aneurysm     Other Visit Diagnoses       Medicare annual wellness visit, subsequent    -  Primary    Need for vaccination        Lung nodules        Relevant Orders    CT chest wo IV contrast    Colon cancer screening        Relevant Orders    Colonoscopy Screening        I have discussed the collaborative care model for this patient's behavioral health care.  Written detailed information and identifying the members of this care team was provided to patient.  They give permission for the Behavioral Health Manager (BHM) and psychiatric consultant to be included in their care with my continued primary management.  Patient made aware that services provided as part of the Collaborative Care Model are subject  to insurance billing.      FOLLOW UP 3 MONTHS

## 2023-09-14 ENCOUNTER — APPOINTMENT (OUTPATIENT)
Dept: PRIMARY CARE | Facility: CLINIC | Age: 68
End: 2023-09-14
Payer: MEDICARE

## 2023-09-14 ENCOUNTER — SOCIAL WORK (OUTPATIENT)
Dept: PRIMARY CARE | Facility: CLINIC | Age: 68
End: 2023-09-14
Payer: MEDICARE

## 2023-09-14 DIAGNOSIS — F33.1 MODERATE EPISODE OF RECURRENT MAJOR DEPRESSIVE DISORDER (MULTI): Primary | ICD-10-CM

## 2023-09-14 ASSESSMENT — PATIENT HEALTH QUESTIONNAIRE - PHQ9
6. FEELING BAD ABOUT YOURSELF - OR THAT YOU ARE A FAILURE OR HAVE LET YOURSELF OR YOUR FAMILY DOWN: NOT AT ALL
2. FEELING DOWN, DEPRESSED OR HOPELESS: SEVERAL DAYS
4. FEELING TIRED OR HAVING LITTLE ENERGY: NEARLY EVERY DAY
5. POOR APPETITE OR OVEREATING: NOT AT ALL
SUM OF ALL RESPONSES TO PHQ9 QUESTIONS 1 & 2: 2
1. LITTLE INTEREST OR PLEASURE IN DOING THINGS: SEVERAL DAYS
3. TROUBLE FALLING OR STAYING ASLEEP: MORE THAN HALF THE DAYS
9. THOUGHTS THAT YOU WOULD BE BETTER OFF DEAD, OR OF HURTING YOURSELF: SEVERAL DAYS
7. TROUBLE CONCENTRATING ON THINGS, SUCH AS READING THE NEWSPAPER OR WATCHING TELEVISION: MORE THAN HALF THE DAYS
10. IF YOU CHECKED OFF ANY PROBLEMS, HOW DIFFICULT HAVE THESE PROBLEMS MADE IT FOR YOU TO DO YOUR WORK, TAKE CARE OF THINGS AT HOME, OR GET ALONG WITH OTHER PEOPLE: NOT DIFFICULT AT ALL
8. MOVING OR SPEAKING SO SLOWLY THAT OTHER PEOPLE COULD HAVE NOTICED. OR THE OPPOSITE, BEING SO FIGETY OR RESTLESS THAT YOU HAVE BEEN MOVING AROUND A LOT MORE THAN USUAL: NOT AT ALL
SUM OF ALL RESPONSES TO PHQ QUESTIONS 1-9: 10

## 2023-09-14 NOTE — PROGRESS NOTES
Collaborative Care (CoCM)  Progress Note    Type of Interaction: In Office    Start Time: 2:08 PM    End Time: 3:05 PM    Appointment: Scheduled    Reason for Visit:   Chief Complaint   Patient presents with    Follow-up      Interval History / Patient Symptoms:     Patient Health Questionnaire-9 Score: 10 (9/14/2023  3:00 PM)    Interventions Provided: Interpersonal Therapy and Strengths Exploration    Progress Made: Slight    Response to Intervention: Discussed childhood and how certain personality traits have assisted patient with her ability to cope with challenges she faced.  Talked about how patient's perspectives have helped her to continue to push forward even when in difficult situations.  Patient explored- positive mindset, how she utilizes positive mindset and what it would look like if certain thoughts were changed from looking at the negative events in a month to looking for positive events that happen.  Patient had a more difficult time identifying positive aspects of each month.    Plan:     Follow up in 2 weeks      Follow Up / Next Appointment: Next appointment: 09/28/23

## 2023-09-28 LAB — URINE CULTURE: NORMAL

## 2023-09-29 ENCOUNTER — DOCUMENTATION (OUTPATIENT)
Dept: PRIMARY CARE | Facility: CLINIC | Age: 68
End: 2023-09-29
Payer: COMMERCIAL

## 2023-09-29 DIAGNOSIS — F33.1 MODERATE EPISODE OF RECURRENT MAJOR DEPRESSIVE DISORDER (MULTI): Primary | ICD-10-CM

## 2023-09-29 PROCEDURE — 99493 SBSQ PSYC COLLAB CARE MGMT: CPT | Performed by: FAMILY MEDICINE

## 2023-10-01 ENCOUNTER — APPOINTMENT (OUTPATIENT)
Dept: RADIOLOGY | Facility: HOSPITAL | Age: 68
End: 2023-10-01
Payer: MEDICARE

## 2023-10-01 ENCOUNTER — HOSPITAL ENCOUNTER (EMERGENCY)
Facility: HOSPITAL | Age: 68
Discharge: HOME | End: 2023-10-01
Attending: EMERGENCY MEDICINE
Payer: MEDICARE

## 2023-10-01 VITALS
HEIGHT: 68 IN | BODY MASS INDEX: 19.1 KG/M2 | DIASTOLIC BLOOD PRESSURE: 96 MMHG | WEIGHT: 126 LBS | OXYGEN SATURATION: 96 % | RESPIRATION RATE: 16 BRPM | TEMPERATURE: 98.7 F | SYSTOLIC BLOOD PRESSURE: 167 MMHG | HEART RATE: 64 BPM

## 2023-10-01 DIAGNOSIS — S32.000A CLOSED COMPRESSION FRACTURE OF LUMBOSACRAL SPINE, INITIAL ENCOUNTER (MULTI): ICD-10-CM

## 2023-10-01 DIAGNOSIS — J81.1 CHRONIC PULMONARY EDEMA (HHS-HCC): ICD-10-CM

## 2023-10-01 DIAGNOSIS — N39.0 UTI (URINARY TRACT INFECTION), UNCOMPLICATED: Primary | ICD-10-CM

## 2023-10-01 LAB
ALBUMIN SERPL BCP-MCNC: 4.1 G/DL (ref 3.4–5)
ALP SERPL-CCNC: 59 U/L (ref 33–136)
ALT SERPL W P-5'-P-CCNC: 7 U/L (ref 7–45)
ANION GAP SERPL CALC-SCNC: 12 MMOL/L (ref 10–20)
APPEARANCE UR: ABNORMAL
AST SERPL W P-5'-P-CCNC: 16 U/L (ref 9–39)
BACTERIA #/AREA URNS AUTO: ABNORMAL /HPF
BASOPHILS # BLD AUTO: 0.02 X10*3/UL (ref 0–0.1)
BASOPHILS NFR BLD AUTO: 0.3 %
BILIRUB SERPL-MCNC: 0.5 MG/DL (ref 0–1.2)
BILIRUB UR STRIP.AUTO-MCNC: NEGATIVE MG/DL
BNP SERPL-MCNC: 111 PG/ML (ref 0–99)
BUN SERPL-MCNC: 10 MG/DL (ref 6–23)
CALCIUM SERPL-MCNC: 9 MG/DL (ref 8.6–10.3)
CHLORIDE SERPL-SCNC: 102 MMOL/L (ref 98–107)
CO2 SERPL-SCNC: 27 MMOL/L (ref 21–32)
COLOR UR: ABNORMAL
CREAT SERPL-MCNC: 0.8 MG/DL (ref 0.5–1.05)
EOSINOPHIL # BLD AUTO: 0.23 X10*3/UL (ref 0–0.7)
EOSINOPHIL NFR BLD AUTO: 3 %
ERYTHROCYTE [DISTWIDTH] IN BLOOD BY AUTOMATED COUNT: 14.7 % (ref 11.5–14.5)
GFR SERPL CREATININE-BSD FRML MDRD: 81 ML/MIN/1.73M*2
GLUCOSE SERPL-MCNC: 92 MG/DL (ref 74–99)
GLUCOSE UR STRIP.AUTO-MCNC: NEGATIVE MG/DL
HCT VFR BLD AUTO: 42.1 % (ref 36–46)
HGB BLD-MCNC: 14 G/DL (ref 12–16)
IMM GRANULOCYTES # BLD AUTO: 0.03 X10*3/UL (ref 0–0.7)
IMM GRANULOCYTES NFR BLD AUTO: 0.4 % (ref 0–0.9)
KETONES UR STRIP.AUTO-MCNC: NEGATIVE MG/DL
LEUKOCYTE ESTERASE UR QL STRIP.AUTO: ABNORMAL
LYMPHOCYTES # BLD AUTO: 2.15 X10*3/UL (ref 1.2–4.8)
LYMPHOCYTES NFR BLD AUTO: 28.4 %
MCH RBC QN AUTO: 31.5 PG (ref 26–34)
MCHC RBC AUTO-ENTMCNC: 33.3 G/DL (ref 32–36)
MCV RBC AUTO: 95 FL (ref 80–100)
MONOCYTES # BLD AUTO: 0.57 X10*3/UL (ref 0.1–1)
MONOCYTES NFR BLD AUTO: 7.5 %
MUCOUS THREADS #/AREA URNS AUTO: ABNORMAL /LPF
NEUTROPHILS # BLD AUTO: 4.56 X10*3/UL (ref 1.2–7.7)
NEUTROPHILS NFR BLD AUTO: 60.4 %
NITRITE UR QL STRIP.AUTO: NEGATIVE
NRBC BLD-RTO: 0 /100 WBCS (ref 0–0)
PH UR STRIP.AUTO: 5 [PH]
PLATELET # BLD AUTO: 301 X10*3/UL (ref 150–450)
PMV BLD AUTO: 8.8 FL (ref 7.5–11.5)
POTASSIUM SERPL-SCNC: 3.6 MMOL/L (ref 3.5–5.3)
PROT SERPL-MCNC: 7.4 G/DL (ref 6.4–8.2)
PROT UR STRIP.AUTO-MCNC: ABNORMAL MG/DL
RBC # BLD AUTO: 4.44 X10*6/UL (ref 4–5.2)
RBC # UR STRIP.AUTO: NEGATIVE /UL
RBC #/AREA URNS AUTO: ABNORMAL /HPF
SODIUM SERPL-SCNC: 137 MMOL/L (ref 136–145)
SP GR UR STRIP.AUTO: 1.02
SQUAMOUS #/AREA URNS AUTO: ABNORMAL /HPF
UROBILINOGEN UR STRIP.AUTO-MCNC: <2 MG/DL
WBC # BLD AUTO: 7.6 X10*3/UL (ref 4.4–11.3)
WBC #/AREA URNS AUTO: >50 /HPF

## 2023-10-01 PROCEDURE — 74177 CT ABD & PELVIS W/CONTRAST: CPT | Performed by: STUDENT IN AN ORGANIZED HEALTH CARE EDUCATION/TRAINING PROGRAM

## 2023-10-01 PROCEDURE — 83880 ASSAY OF NATRIURETIC PEPTIDE: CPT | Performed by: PHYSICIAN ASSISTANT

## 2023-10-01 PROCEDURE — 71275 CT ANGIOGRAPHY CHEST: CPT | Performed by: STUDENT IN AN ORGANIZED HEALTH CARE EDUCATION/TRAINING PROGRAM

## 2023-10-01 PROCEDURE — 99285 EMERGENCY DEPT VISIT HI MDM: CPT | Mod: 25 | Performed by: EMERGENCY MEDICINE

## 2023-10-01 PROCEDURE — 2500000005 HC RX 250 GENERAL PHARMACY W/O HCPCS: Performed by: PHYSICIAN ASSISTANT

## 2023-10-01 PROCEDURE — 71275 CT ANGIOGRAPHY CHEST: CPT

## 2023-10-01 PROCEDURE — 2500000001 HC RX 250 WO HCPCS SELF ADMINISTERED DRUGS (ALT 637 FOR MEDICARE OP): Performed by: PHYSICIAN ASSISTANT

## 2023-10-01 PROCEDURE — 2550000001 HC RX 255 CONTRASTS: Performed by: PHYSICIAN ASSISTANT

## 2023-10-01 PROCEDURE — 81001 URINALYSIS AUTO W/SCOPE: CPT | Performed by: PHYSICIAN ASSISTANT

## 2023-10-01 PROCEDURE — 80053 COMPREHEN METABOLIC PANEL: CPT | Performed by: PHYSICIAN ASSISTANT

## 2023-10-01 PROCEDURE — 74177 CT ABD & PELVIS W/CONTRAST: CPT

## 2023-10-01 PROCEDURE — 87086 URINE CULTURE/COLONY COUNT: CPT | Mod: AHULAB,CMCLAB | Performed by: PHYSICIAN ASSISTANT

## 2023-10-01 PROCEDURE — 36415 COLL VENOUS BLD VENIPUNCTURE: CPT | Performed by: PHYSICIAN ASSISTANT

## 2023-10-01 PROCEDURE — 85025 COMPLETE CBC W/AUTO DIFF WBC: CPT | Performed by: PHYSICIAN ASSISTANT

## 2023-10-01 RX ORDER — LIDOCAINE 560 MG/1
2 PATCH PERCUTANEOUS; TOPICAL; TRANSDERMAL DAILY
Qty: 10 PATCH | Refills: 0 | Status: SHIPPED | OUTPATIENT
Start: 2023-10-01 | End: 2023-10-06

## 2023-10-01 RX ORDER — GRANULES FOR ORAL 3 G/1
3 POWDER ORAL ONCE
Status: COMPLETED | OUTPATIENT
Start: 2023-10-01 | End: 2023-10-01

## 2023-10-01 RX ORDER — LIDOCAINE 560 MG/1
2 PATCH PERCUTANEOUS; TOPICAL; TRANSDERMAL DAILY
Status: DISCONTINUED | OUTPATIENT
Start: 2023-10-01 | End: 2023-10-01 | Stop reason: HOSPADM

## 2023-10-01 RX ORDER — GRANULES FOR ORAL 3 G/1
3 POWDER ORAL ONCE
Qty: 3 G | Refills: 0 | Status: SHIPPED | OUTPATIENT
Start: 2023-10-03 | End: 2023-10-02 | Stop reason: ALTCHOICE

## 2023-10-01 RX ADMIN — LIDOCAINE 2 PATCH: 4 PATCH TOPICAL at 18:31

## 2023-10-01 RX ADMIN — FOSFOMYCIN TROMETHAMINE 3 G: 3 GRANULE, FOR SOLUTION ORAL at 18:31

## 2023-10-01 RX ADMIN — IOHEXOL 75 ML: 350 INJECTION, SOLUTION INTRAVENOUS at 16:29

## 2023-10-01 ASSESSMENT — PAIN - FUNCTIONAL ASSESSMENT: PAIN_FUNCTIONAL_ASSESSMENT: 0-10

## 2023-10-01 ASSESSMENT — PAIN SCALES - GENERAL: PAINLEVEL_OUTOF10: 10 - WORST POSSIBLE PAIN

## 2023-10-01 ASSESSMENT — COLUMBIA-SUICIDE SEVERITY RATING SCALE - C-SSRS
1. IN THE PAST MONTH, HAVE YOU WISHED YOU WERE DEAD OR WISHED YOU COULD GO TO SLEEP AND NOT WAKE UP?: NO
2. HAVE YOU ACTUALLY HAD ANY THOUGHTS OF KILLING YOURSELF?: NO
6. HAVE YOU EVER DONE ANYTHING, STARTED TO DO ANYTHING, OR PREPARED TO DO ANYTHING TO END YOUR LIFE?: NO

## 2023-10-01 ASSESSMENT — PAIN DESCRIPTION - LOCATION: LOCATION: BACK

## 2023-10-01 NOTE — ED PROVIDER NOTES
HPI   Chief Complaint   Patient presents with   • Back Pain     Middle back pain x 1.5 weeks. No known injury, wants an xray       67-year-old female with a history of ulcerative colitis, hypertension, and TIA presents with multiple complaints.  Patient states that she developed atraumatic back pain approximately 8 days ago.  Since that time, she has been to urgent care twice.  First time they diagnosed her with UTI and placed her on an antibiotic that she does not recall the name of.  She took that in its entirety but remained with pain.  She went back yesterday, however all x-ray machines were down, so she went home.  She returns today as the pain is increasing she describes it as a burning sensation in the right side of her back.  She denies urinary dysuria, hematuria, frequency, numbness or tingling down the extremities, loss of bowel or bladder function, nausea, vomiting, or abdominal pain.  The patient does states she feels she has had a low-grade fever but did not check.  She also reports pain with deep inspiration located on the front and back of her chest which is new.                        No data recorded                Patient History   Past Medical History:   Diagnosis Date   • Furuncle of groin 08/23/2022    Boil, groin   • Infection and inflammatory reaction due to other internal joint prosthesis, initial encounter (CMS/[facility]) 08/23/2022    Chronic infection of knee joint prosthesis, initial encounter   • Left sided colitis without complications (CMS/[facility]) 10/03/2017    Ulcerative colitis, left sided   • Personal history of (healed) traumatic fracture 08/23/2022    History of compression fracture of spine   • Personal history of other diseases of the circulatory system 08/23/2022    History of hypertension   • Personal history of other diseases of the respiratory system 08/23/2022    History of acute sinusitis   • Personal history of other diseases of urinary system 08/23/2022    History of hematuria    • Personal history of transient ischemic attack (TIA), and cerebral infarction without residual deficits 08/23/2022    History of cerebrovascular accident   • Unspecified cataract 08/23/2022    Cataracts, both eyes   • Urinary tract infection, site not specified 08/23/2022    Acute UTI   • Vitreous degeneration, left eye 08/23/2022    PVD (posterior vitreous detachment), left eye     Past Surgical History:   Procedure Laterality Date   • CT HEAD ANGIO W AND WO IV CONTRAST  1/21/2019    CT HEAD ANGIO W AND WO IV CONTRAST 1/21/2019 [facility] ANCILLARY LEGACY   • CT HEAD ANGIO W AND WO IV CONTRAST  6/4/2020    CT HEAD ANGIO W AND WO IV CONTRAST 6/4/2020 [facility] ANCILLARY LEGACY   • MR HEAD ANGIO WO IV CONTRAST  6/3/2018    MR HEAD ANGIO WO IV CONTRAST 6/3/2018 [facility] CLINICAL LEGACY   • MR HEAD ANGIO WO IV CONTRAST  8/18/2022    MR HEAD ANGIO WO IV CONTRAST 8/18/2022 [facility] ANCILLARY LEGACY   • MR NECK ANGIO WO IV CONTRAST  6/3/2018    MR NECK ANGIO WO IV CONTRAST 6/3/2018 [facility] CLINICAL LEGACY   • OTHER SURGICAL HISTORY  08/23/2022    Appendectomy   • OTHER SURGICAL HISTORY  08/23/2022    Back surgery   • OTHER SURGICAL HISTORY  08/23/2022    Hip replacement   • OTHER SURGICAL HISTORY  08/23/2022    Knee replacement   • OTHER SURGICAL HISTORY  08/23/2022    Hand surgery     Family History   Problem Relation Name Age of Onset   • Hypertension Mother     • Hypertension Father     • Other (Malignant Neoplasm of the Oral Cavity) Father     • Hypertension Sister     • Hypertension Brother       Social History     Tobacco Use   • Smoking status: Every Day     Packs/day: 1.00     Years: 50.00     Additional pack years: 0.00     Total pack years: 50.00     Types: Cigarettes   • Smokeless tobacco: Never   Substance Use Topics   • Alcohol use: Yes   • Drug use: Never       Physical Exam   ED Triage Vitals [10/01/23 1337]   Temp Heart Rate Resp BP   37.1 °C (98.7 °F) 82 18 143/83      SpO2 Temp Source Heart Rate Source Patient Position   94 %  Oral Monitor Sitting      BP Location FiO2 (%)     Right arm --       Physical Exam  Constitutional:       General: She is not in acute distress.     Appearance: Normal appearance. She is not ill-appearing.   HENT:      Head: Normocephalic and atraumatic.      Right Ear: External ear normal.      Left Ear: External ear normal.      Nose: Nose normal.      Mouth/Throat:      Mouth: Mucous membranes are moist.   Eyes:      Extraocular Movements: Extraocular movements intact.      Conjunctiva/sclera: Conjunctivae normal.      Pupils: Pupils are equal, round, and reactive to light.   Cardiovascular:      Rate and Rhythm: Normal rate and regular rhythm.      Pulses: Normal pulses.   Pulmonary:      Effort: Pulmonary effort is normal. No respiratory distress.      Breath sounds: Normal breath sounds. No wheezing, rhonchi or rales.   Chest:      Chest wall: No tenderness.   Abdominal:      General: Abdomen is flat. There is no distension.      Palpations: Abdomen is soft. There is no mass.      Tenderness: There is no abdominal tenderness. There is right CVA tenderness and left CVA tenderness. There is no guarding.   Musculoskeletal:         General: Normal range of motion.      Cervical back: Normal range of motion and neck supple.   Skin:     General: Skin is warm and dry.      Capillary Refill: Capillary refill takes less than 2 seconds.   Neurological:      General: No focal deficit present.      Mental Status: She is alert and oriented to person, place, and time.      Sensory: No sensory deficit.      Motor: No weakness.      Gait: Gait normal.   Psychiatric:         Mood and Affect: Mood normal.       ED Course & MDM   ED Course as of 10/01/23 2210   Sun Oct 01, 2023   1424 SpO2: 94 %  Noted, lower than normal. Will workup. [EP]   1504 Urinalysis Microscopic Only(!)  reviewed [EP]   2209 Sat improved to 96% [EP]   2210 B-type natriuretic peptide(!)  Noted, increased from prior 63 4 years ago [EP]   2210 CT angio  chest for pulmonary embolism  reviewed [EP]   2210 CT abdomen pelvis w IV contrast  reviewed [EP]      ED Course User Index  [EP] Elda Vega PA-C         Diagnoses as of 10/01/23 2210   UTI (urinary tract infection), uncomplicated   Closed compression fracture of lumbosacral spine, initial encounter (CMS/[facility])   Chronic pulmonary edema       Medical Decision Making  67-year-old female with a history of ulcerative colitis, hypertension, and TIA presents with multiple complaints.  Patient is nontoxic-appearing, however her vital signs are notable for SPO2 at 94% but are otherwise unremarkable.  Based on her presentation, differential diagnosis includes bilateral PE, renal insufficiency, renal calculi, hydronephrosis, pyelonephritis, heart failure exacerbation, dehydration.  Low suspicion for spinal related injury as the patient recalls no trauma and has no midline bony tenderness.  Will obtain labs of CBC with differential, CMP, BNP, urinalysis, and urine culture.  We will also obtain CT angio chest for PE and CT abdomen and pelvis with IV contrast pending labs.  Upon chart review, I am only able to see the patient's urine culture from 9/27/2023 which showed no growth. Urinalysis showed moderate leukocyte esterase, greater than 50 white blood cells, and 1+ bacteria.  Culture is still pending.  BNP obtained and was 111.  This was compared to prior 63 4 years ago and 65 years ago.  CBC with differential was grossly unremarkable.  CMP grossly unremarkable.  CT abdomen and pelvis with IV contrast showed no acute abnormality but did show patient's known colitis.  Did show multilevel compression deformities involving T9, T12, and L5 vertebral bodies of indeterminate chronicity.  CT chest angio for PE showed trace bilateral pleural effusion without PE.  Given the late hour, patient was treated for UTI while in the emergency department.  Patient has extensive amount of allergies, so she was given fosfomycin and had no  reaction.  Also reviewed her spinal compression fractures with her and placed lidocaine patches for additional pain control.  Patient cannot take NSAIDs due to her history of colitis and has been taking Tylenol at home.  Patient was seen with Dr. Bernal.    Diagnosis/treatment  1.  Pulmonary edema: Patient was educated of her findings.  She stated that she had previously been on Lasix and was unsure why her doctor took her off of this medication.  Given that she has no significant symptoms today and her vital signs are normal, will defer to outpatient management.  Recommended she see her primary care physician this week for further management.  Recommended returning to the ER for any new or worsening symptoms.  Patient agreeable to plan of care and felt comfortable returning home.    2.  Compression fractures and lumbosacral spine: Patient was educated of these findings.  She did admit to the attending that she had a fall approximately 2 weeks ago and had some back pain.  The area of her pain could be radiating pain from these fractures, so she was given a prescription for lidocaine patches to go over these areas.  Recommended following up with orthopedics for potential intervention or further imaging.  Recommended seeing primary care physician for additional pain control.  We discussed reasons to return to the emergency department including but not limited to increased pain, weakness, numbness or tingling, loss of bowel or bladder function, or any other concerning symptoms.  Patient agreeable to plan of care and felt comfortable returning home.    3.  UTI: Patient was educated that her urinalysis today is concerning for possible UTI.  The culture is still pending.  Someone will call her with the final results.  Given her extensive list of allergies, the patient was given fosfomycin.  After discussion with pharmacy, patients often only require 1 dose of this strong medication.  I did give her prescription for 1  additional dose to be taken in 48 hours.  Recommended close follow-up with primary care physician for possible recurrent UTI.  Recommended returning for any new or worsening symptoms including but not limited to new symptoms, fever, or chills.  Patient agreeable to plan of care and felt comfortable returning home.    Amount and/or Complexity of Data Reviewed  External Data Reviewed: labs.  Labs: ordered. Decision-making details documented in ED Course.  Radiology: ordered. Decision-making details documented in ED Course.        Procedure  Procedures     Elda Vega PA-C  10/01/23 2218       Elda Vega PA-C  10/01/23 2218

## 2023-10-02 ENCOUNTER — TELEPHONE (OUTPATIENT)
Dept: PRIMARY CARE | Facility: CLINIC | Age: 68
End: 2023-10-02

## 2023-10-02 ENCOUNTER — OFFICE VISIT (OUTPATIENT)
Dept: PRIMARY CARE | Facility: CLINIC | Age: 68
End: 2023-10-02
Payer: COMMERCIAL

## 2023-10-02 VITALS — HEART RATE: 76 BPM | OXYGEN SATURATION: 96 % | DIASTOLIC BLOOD PRESSURE: 80 MMHG | SYSTOLIC BLOOD PRESSURE: 130 MMHG

## 2023-10-02 DIAGNOSIS — J43.9 PULMONARY EMPHYSEMA, UNSPECIFIED EMPHYSEMA TYPE (MULTI): ICD-10-CM

## 2023-10-02 DIAGNOSIS — K51.90 ULCERATIVE COLITIS WITHOUT COMPLICATIONS, UNSPECIFIED LOCATION (MULTI): ICD-10-CM

## 2023-10-02 DIAGNOSIS — M54.50 ACUTE BILATERAL LOW BACK PAIN WITHOUT SCIATICA: ICD-10-CM

## 2023-10-02 DIAGNOSIS — I11.0 HYPERTENSIVE HEART DISEASE WITH CONGESTIVE HEART FAILURE, UNSPECIFIED HEART FAILURE TYPE (MULTI): ICD-10-CM

## 2023-10-02 DIAGNOSIS — C67.2 MALIGNANT NEOPLASM OF LATERAL WALL OF URINARY BLADDER (MULTI): ICD-10-CM

## 2023-10-02 DIAGNOSIS — I67.1 UNRUPTURED CEREBRAL ANEURYSM (HHS-HCC): ICD-10-CM

## 2023-10-02 DIAGNOSIS — S32.050A COMPRESSION FRACTURE OF L5 LUMBAR VERTEBRA, CLOSED, INITIAL ENCOUNTER (MULTI): Primary | ICD-10-CM

## 2023-10-02 PROCEDURE — 4004F PT TOBACCO SCREEN RCVD TLK: CPT | Performed by: FAMILY MEDICINE

## 2023-10-02 PROCEDURE — 99214 OFFICE O/P EST MOD 30 MIN: CPT | Performed by: FAMILY MEDICINE

## 2023-10-02 PROCEDURE — 1125F AMNT PAIN NOTED PAIN PRSNT: CPT | Performed by: FAMILY MEDICINE

## 2023-10-02 PROCEDURE — 1159F MED LIST DOCD IN RCRD: CPT | Performed by: FAMILY MEDICINE

## 2023-10-02 PROCEDURE — 1160F RVW MEDS BY RX/DR IN RCRD: CPT | Performed by: FAMILY MEDICINE

## 2023-10-02 RX ORDER — HYDROCODONE BITARTRATE AND ACETAMINOPHEN 5; 325 MG/1; MG/1
1 TABLET ORAL EVERY 6 HOURS PRN
Qty: 20 TABLET | Refills: 0 | Status: SHIPPED | OUTPATIENT
Start: 2023-10-02 | End: 2023-10-09

## 2023-10-02 RX ORDER — PREDNISONE 20 MG/1
TABLET ORAL
Qty: 9 TABLET | Refills: 0 | Status: SHIPPED | OUTPATIENT
Start: 2023-10-02 | End: 2023-10-08

## 2023-10-02 RX ORDER — NITROFURANTOIN 25; 75 MG/1; MG/1
100 CAPSULE ORAL EVERY 12 HOURS SCHEDULED
COMMUNITY
Start: 2023-09-26 | End: 2023-10-02 | Stop reason: ALTCHOICE

## 2023-10-02 RX ORDER — SULFASALAZINE 500 MG/1
500 TABLET ORAL 2 TIMES DAILY
Qty: 60 TABLET | Refills: 2 | Status: ON HOLD | OUTPATIENT
Start: 2023-10-02 | End: 2023-12-18 | Stop reason: ENTERED-IN-ERROR

## 2023-10-02 NOTE — ASSESSMENT & PLAN NOTE
Currently flared - refilling Sulfasalazine.  Increased fluids, BRAT diet to help with flare.  Call if any new or worsening symptoms.

## 2023-10-02 NOTE — PATIENT INSTRUCTIONS

## 2023-10-02 NOTE — PROGRESS NOTES
Subjective   Patient ID: Julissa New is a 67 y.o. female who presents for Hospital Follow-up ([place], UTI).  HPI    Bladder symptoms started a few weeks ago  She ws evaluated at  2 weeks ago 9/27/2023 thought she had UTI - treated with ATB   Review of that culture negative for growth    Went to ED yesterday for low back and abdominal pain  Urine sent for culture and still pending  Had  mildly elevated BNP and CT chest reveals mild bilateral effusions    No treatment for UTI, Lidocaine patches for low back pain given as well    Fell approximately 1 month ago, had some bruising along her ribs - she does admit that she had been drinking with her son at that time  She states that she has not been drinking any alcohol since that episode.    No radicular symptoms, no loss of bowel or bladder control.  No head injury related to this fall.    She is set up for DEXA that we ordered earlier this summer.  Also set up for CT lung screening 10/30/2023    BACK PAIN 9/10 fairly consistent  Worst along right side with radiation into left back   Pain mostly low back region  No alleviating factors except if she lies flat   No bowel or bladder symptoms.    Review of Systems    Review of Systems negative except as noted in HPI and Chief complaint.     Objective               Physical Exam  Constitutional:       General: She is not in acute distress.     Appearance: Normal appearance. She is not ill-appearing.   HENT:      Head: Normocephalic and atraumatic.   Neck:      Vascular: No carotid bruit.   Cardiovascular:      Rate and Rhythm: Normal rate and regular rhythm.      Pulses: Normal pulses.      Heart sounds: Normal heart sounds. No murmur heard.     No gallop.   Pulmonary:      Effort: Pulmonary effort is normal.      Breath sounds: Normal breath sounds. No wheezing, rhonchi or rales.   Musculoskeletal:      Cervical back: Normal range of motion and neck supple. No rigidity or tenderness.      Comments: Ambulating normally  - no splinting with motion or palpation of spine   Lymphadenopathy:      Cervical: No cervical adenopathy.   Skin:     General: Skin is warm and dry.   Neurological:      Mental Status: She is alert.   Psychiatric:         Mood and Affect: Mood normal.         Behavior: Behavior normal.       /80 (BP Location: Left arm, Patient Position: Sitting, BP Cuff Size: Adult)   Pulse 76   SpO2 96%      Assessment/Plan   Problem List Items Addressed This Visit       COPD (chronic obstructive pulmonary disease) with emphysema (CMS/[facility])     Stable - current SOB related to compression fracture.  Chest CT negative for pneumonia - suspect effusions related to poor inspiration secondary to pain.         Hypertensive CHF (congestive heart failure) (CMS/[facility])     BP controlled today.  Continue medications at current dosage - will continue to monitor.         Malignant neoplasm of lateral wall of urinary bladder (CMS/[facility])     Will plan follow up with urology once acute pain improves.         Ulcerative colitis (CMS/[facility])     Currently flared - refilling Sulfasalazine.  Increased fluids, BRAT diet to help with flare.  Call if any new or worsening symptoms.         Relevant Medications    sulfaSALAzine (Azulfidine) 500 mg tablet    Unruptured cerebral aneurysm     Other Visit Diagnoses       Compression fracture of L5 lumbar vertebra, closed, initial encounter (CMS/[facility])    -  Primary    Start Lidocaine Patches.  Short supply of Hydrocodone/APAP for acute compression fractures.  Advised to avoid alcohol during treatment.    Relevant Medications    HYDROcodone-acetaminophen (Norco) 5-325 mg tablet    Other Relevant Orders    Referral to Physical Therapy    Referral to Orthopaedic Surgery    Acute bilateral low back pain without sciatica        Relevant Medications    predniSONE (Deltasone) 20 mg tablet             Patient was identified as a fall risk. Risk prevention instructions provided.Patient was identified as a fall risk. Risk  prevention instructions provided.

## 2023-10-02 NOTE — ASSESSMENT & PLAN NOTE
Stable - current SOB related to compression fracture.  Chest CT negative for pneumonia - suspect effusions related to poor inspiration secondary to pain.

## 2023-10-05 ENCOUNTER — APPOINTMENT (OUTPATIENT)
Dept: UROLOGY | Facility: CLINIC | Age: 68
End: 2023-10-05
Payer: MEDICARE

## 2023-10-06 LAB — BACTERIA UR CULT: NORMAL

## 2023-10-23 DIAGNOSIS — F33.2 SEVERE EPISODE OF RECURRENT MAJOR DEPRESSIVE DISORDER, WITHOUT PSYCHOTIC FEATURES (MULTI): ICD-10-CM

## 2023-10-23 DIAGNOSIS — F41.9 ANXIETY DISORDER, UNSPECIFIED TYPE: ICD-10-CM

## 2023-10-30 ENCOUNTER — TRANSCRIBE ORDERS (OUTPATIENT)
Dept: ORTHOPEDIC SURGERY | Facility: HOSPITAL | Age: 68
End: 2023-10-30
Payer: MEDICARE

## 2023-10-30 ENCOUNTER — ANCILLARY PROCEDURE (OUTPATIENT)
Dept: RADIOLOGY | Facility: CLINIC | Age: 68
End: 2023-10-30
Payer: MEDICARE

## 2023-10-30 DIAGNOSIS — M54.50 LOW BACK PAIN, UNSPECIFIED BACK PAIN LATERALITY, UNSPECIFIED CHRONICITY, UNSPECIFIED WHETHER SCIATICA PRESENT: ICD-10-CM

## 2023-10-30 PROCEDURE — 72110 X-RAY EXAM L-2 SPINE 4/>VWS: CPT | Performed by: RADIOLOGY

## 2023-10-30 PROCEDURE — 72110 X-RAY EXAM L-2 SPINE 4/>VWS: CPT

## 2023-10-31 ENCOUNTER — OFFICE VISIT (OUTPATIENT)
Dept: ORTHOPEDIC SURGERY | Facility: CLINIC | Age: 68
End: 2023-10-31
Payer: MEDICARE

## 2023-10-31 DIAGNOSIS — G89.29 CHRONIC LOW BACK PAIN WITHOUT SCIATICA, UNSPECIFIED BACK PAIN LATERALITY: ICD-10-CM

## 2023-10-31 DIAGNOSIS — M47.816 ARTHRITIS OF LUMBAR SPINE: Primary | ICD-10-CM

## 2023-10-31 DIAGNOSIS — M54.50 CHRONIC LOW BACK PAIN WITHOUT SCIATICA, UNSPECIFIED BACK PAIN LATERALITY: ICD-10-CM

## 2023-10-31 PROCEDURE — 99213 OFFICE O/P EST LOW 20 MIN: CPT | Performed by: ORTHOPAEDIC SURGERY

## 2023-10-31 PROCEDURE — 1159F MED LIST DOCD IN RCRD: CPT | Performed by: ORTHOPAEDIC SURGERY

## 2023-10-31 PROCEDURE — 4004F PT TOBACCO SCREEN RCVD TLK: CPT | Performed by: ORTHOPAEDIC SURGERY

## 2023-10-31 PROCEDURE — 99203 OFFICE O/P NEW LOW 30 MIN: CPT | Performed by: ORTHOPAEDIC SURGERY

## 2023-10-31 PROCEDURE — 1125F AMNT PAIN NOTED PAIN PRSNT: CPT | Performed by: ORTHOPAEDIC SURGERY

## 2023-10-31 PROCEDURE — 1160F RVW MEDS BY RX/DR IN RCRD: CPT | Performed by: ORTHOPAEDIC SURGERY

## 2023-10-31 ASSESSMENT — PAIN DESCRIPTION - DESCRIPTORS: DESCRIPTORS: ACHING

## 2023-10-31 ASSESSMENT — PAIN SCALES - GENERAL: PAINLEVEL_OUTOF10: 8

## 2023-10-31 ASSESSMENT — PAIN - FUNCTIONAL ASSESSMENT: PAIN_FUNCTIONAL_ASSESSMENT: 0-10

## 2023-10-31 NOTE — PROGRESS NOTES
HPI:Julissa New is a 67-year-old woman, who comes in today with complaints of back pain.  She had a fall about 4 months ago.  She states that she has had back pain for about 3 months.  It is rather constant.  It is worse with activity.  She has not had any significant radicular pain.  She has not had any physical therapy.      ROS:  Reviewed on EMR and patient intake sheet.    PMH/SH:  Reviewed on EMR and patient intake sheet.    Exam:  Physical Exam    Constitutional: Well appearing; no acute distress  Eyes: pupils are equal and round  Psych: normal affect  Respiratory: non-labored breathing  Cardiovascular: regular rate and rhythm  GI: non-distended abdomen  Musculoskeletal: no pain with range of motion of the shoulders bilaterally; no signs of impingement  Neurologic: [4-]/5 strength in the lower extremities bilaterally]; [-] straight leg raise; no clonus; negative babinski    Radiology:     X-rays demonstrate an old T12 and L5 fracture which were present on prior x-rays in 2019    Diagnosis:    Axial low back pain; lumbar arthritis; remote T12 and L5 compression fractures    Assessment and Plan:   67-year-old woman, with axial low back pain in the setting of remote compression fractures.  This time I recommended physical therapy and core strengthening.  I can see her back as needed.    The patient was in agreement with the plan. At the end of the visit today, the patient felt that all questions had been answered satisfactorily.  The patient was pleased with the visit and very appreciative for the care rendered.     Thank you very much for the kind referral.  It is a privilege, and a pleasure, to partner with you in the care of your patients.  I would be delighted to assist you with any further consultations as needed.  Please feel free to have your patients refer to my website, [email or web address], for patient education materials regarding treatment options for common spinal conditions.        Dominic  Gabe Narvaez MD    Chief of Spine Surgery, [facility]  Director of Spine Service, [facility]  , Department of Orthopaedics  [facility] [employer]  [id number]  [place] [id number]  [email or web address]  P: [phone]    This note was dictated with voice recognition software.  It has not been proofread for grammatical errors, typographical mistakes or other semantic inconsistencies.

## 2023-11-01 ENCOUNTER — HOSPITAL ENCOUNTER (OUTPATIENT)
Dept: RADIOLOGY | Facility: CLINIC | Age: 68
Discharge: HOME | End: 2023-11-01
Payer: MEDICARE

## 2023-11-01 DIAGNOSIS — Z12.31 ENCOUNTER FOR SCREENING MAMMOGRAM FOR BREAST CANCER: ICD-10-CM

## 2023-11-01 PROCEDURE — 77067 SCR MAMMO BI INCL CAD: CPT | Mod: BILATERAL PROCEDURE | Performed by: STUDENT IN AN ORGANIZED HEALTH CARE EDUCATION/TRAINING PROGRAM

## 2023-11-01 PROCEDURE — 77063 BREAST TOMOSYNTHESIS BI: CPT | Mod: BILATERAL PROCEDURE | Performed by: STUDENT IN AN ORGANIZED HEALTH CARE EDUCATION/TRAINING PROGRAM

## 2023-11-01 PROCEDURE — 77063 BREAST TOMOSYNTHESIS BI: CPT

## 2023-11-02 ENCOUNTER — TELEPHONE (OUTPATIENT)
Dept: PRIMARY CARE | Facility: CLINIC | Age: 68
End: 2023-11-02
Payer: MEDICARE

## 2023-11-17 ENCOUNTER — OFFICE VISIT (OUTPATIENT)
Dept: PRIMARY CARE | Facility: CLINIC | Age: 68
End: 2023-11-17
Payer: MEDICARE

## 2023-11-17 VITALS — DIASTOLIC BLOOD PRESSURE: 70 MMHG | SYSTOLIC BLOOD PRESSURE: 120 MMHG | OXYGEN SATURATION: 95 % | HEART RATE: 76 BPM

## 2023-11-17 DIAGNOSIS — R82.998 LEUKOCYTES IN URINE: ICD-10-CM

## 2023-11-17 DIAGNOSIS — R31.0 GROSS HEMATURIA: Primary | ICD-10-CM

## 2023-11-17 DIAGNOSIS — M80.08XG FRACTURE OF VERTEBRA DUE TO OSTEOPOROSIS WITH DELAYED HEALING, SUBSEQUENT ENCOUNTER: ICD-10-CM

## 2023-11-17 DIAGNOSIS — Z79.899 HIGH RISK MEDICATION USE: ICD-10-CM

## 2023-11-17 DIAGNOSIS — M54.50 ACUTE MIDLINE LOW BACK PAIN WITHOUT SCIATICA: ICD-10-CM

## 2023-11-17 LAB
POC APPEARANCE, URINE: ABNORMAL
POC BILIRUBIN, URINE: NEGATIVE
POC BLOOD, URINE: NEGATIVE
POC COLOR, URINE: YELLOW
POC GLUCOSE, URINE: NEGATIVE MG/DL
POC KETONES, URINE: NEGATIVE MG/DL
POC LEUKOCYTES, URINE: ABNORMAL
POC NITRITE,URINE: NEGATIVE
POC PH, URINE: 6 PH
POC PROTEIN, URINE: NEGATIVE MG/DL
POC SPECIFIC GRAVITY, URINE: 1.02
POC UROBILINOGEN, URINE: 0.2 EU/DL

## 2023-11-17 PROCEDURE — 4004F PT TOBACCO SCREEN RCVD TLK: CPT | Performed by: FAMILY MEDICINE

## 2023-11-17 PROCEDURE — 1125F AMNT PAIN NOTED PAIN PRSNT: CPT | Performed by: FAMILY MEDICINE

## 2023-11-17 PROCEDURE — 81002 URINALYSIS NONAUTO W/O SCOPE: CPT | Performed by: FAMILY MEDICINE

## 2023-11-17 PROCEDURE — 99214 OFFICE O/P EST MOD 30 MIN: CPT | Performed by: FAMILY MEDICINE

## 2023-11-17 PROCEDURE — 1159F MED LIST DOCD IN RCRD: CPT | Performed by: FAMILY MEDICINE

## 2023-11-17 PROCEDURE — 1160F RVW MEDS BY RX/DR IN RCRD: CPT | Performed by: FAMILY MEDICINE

## 2023-11-17 PROCEDURE — 87086 URINE CULTURE/COLONY COUNT: CPT

## 2023-11-17 RX ORDER — NALOXONE HYDROCHLORIDE 4 MG/.1ML
4 SPRAY NASAL AS NEEDED
Qty: 1 EACH | Refills: 0 | Status: SHIPPED | OUTPATIENT
Start: 2023-11-17 | End: 2024-01-02 | Stop reason: SDUPTHER

## 2023-11-17 RX ORDER — ACETAMINOPHEN AND CODEINE PHOSPHATE 300; 30 MG/1; MG/1
1 TABLET ORAL EVERY 4 HOURS PRN
Qty: 24 TABLET | Refills: 0 | Status: SHIPPED | OUTPATIENT
Start: 2023-11-17 | End: 2023-11-23

## 2023-11-17 RX ORDER — CYCLOBENZAPRINE HCL 10 MG
10 TABLET ORAL NIGHTLY PRN
Qty: 30 TABLET | Refills: 0 | Status: ON HOLD | OUTPATIENT
Start: 2023-11-17 | End: 2023-12-18 | Stop reason: ENTERED-IN-ERROR

## 2023-11-17 NOTE — PROGRESS NOTES
Subjective   Patient ID: Julissa New is a 68 y.o. female who presents for Back Pain (Brown colored urine 2 days ago, back pain in the middle of her back for a while now, not getting better. ).  HPI    BACK PAIN: chronic back pain but did have a fall in September after having several drinks with her son.  Exacerbation of back pain at that time.  Review of xrays from 10/30 show unchanged  vertebral fractures in T9, T12 and L1    Pain takes her breath away some days  9-10/10 at times    Review of Systems    Review of Systems negative except as noted in HPI and Chief complaint.     Objective               Physical Exam  Constitutional:       General: She is not in acute distress.     Appearance: Normal appearance. She is not ill-appearing.   HENT:      Head: Normocephalic and atraumatic.   Neck:      Vascular: No carotid bruit.   Cardiovascular:      Rate and Rhythm: Normal rate and regular rhythm.      Pulses: Normal pulses.      Heart sounds: Normal heart sounds. No murmur heard.     No gallop.   Pulmonary:      Effort: Pulmonary effort is normal.      Breath sounds: Normal breath sounds. No wheezing, rhonchi or rales.   Musculoskeletal:      Cervical back: Normal range of motion and neck supple. No rigidity or tenderness.   Lymphadenopathy:      Cervical: No cervical adenopathy.   Skin:     General: Skin is warm and dry.   Neurological:      Mental Status: She is alert.   Psychiatric:         Mood and Affect: Mood normal.         Behavior: Behavior normal.       /70 (BP Location: Left arm, Patient Position: Sitting, BP Cuff Size: Adult)   Pulse 76   SpO2 95%      Assessment/Plan   Problem List Items Addressed This Visit       Hematuria - Primary    Relevant Orders    Referral to Urology     Other Visit Diagnoses       Acute midline low back pain without sciatica        Relevant Medications    cyclobenzaprine (Flexeril) 10 mg tablet    Other Relevant Orders    POCT UA (nonautomated) manually resulted  (Completed)    Leukocytes in urine        Relevant Orders    Urine Culture (Completed)    Fracture of vertebra due to osteoporosis with delayed healing, subsequent encounter        High risk medication use        Relevant Medications    naloxone (Narcan) 4 mg/0.1 mL nasal spray

## 2023-11-19 LAB — BACTERIA UR CULT: NO GROWTH

## 2023-11-22 ENCOUNTER — TELEPHONE (OUTPATIENT)
Dept: PRIMARY CARE | Facility: CLINIC | Age: 68
End: 2023-11-22
Payer: MEDICARE

## 2023-11-22 NOTE — TELEPHONE ENCOUNTER
----- Message from Yarely Mitchell DO sent at 11/20/2023  5:56 PM EST -----  Urine cultures is negative - would like her to see urology for recurring hematuria and UTI symptoms. Referral placed in chart, please help her schedule or sh can call [phone] to schedule.

## 2023-11-29 ENCOUNTER — APPOINTMENT (OUTPATIENT)
Dept: UROLOGY | Facility: CLINIC | Age: 68
End: 2023-11-29
Payer: MEDICARE

## 2023-12-05 ENCOUNTER — TELEPHONE (OUTPATIENT)
Dept: PRIMARY CARE | Facility: CLINIC | Age: 68
End: 2023-12-05
Payer: MEDICARE

## 2023-12-05 NOTE — TELEPHONE ENCOUNTER
Pt state she need a referral for a wound. A heavy box drop on her shin and it is throbbing and bruised. Her ankle is red and the wound 1in wide and started oozing today. She did not go to ER but believe she need a vascular Dr or Wound specialist.  Can you please advise.

## 2023-12-17 ENCOUNTER — APPOINTMENT (OUTPATIENT)
Dept: RADIOLOGY | Facility: HOSPITAL | Age: 68
DRG: 603 | End: 2023-12-17
Payer: MEDICARE

## 2023-12-17 ENCOUNTER — HOSPITAL ENCOUNTER (OUTPATIENT)
Facility: HOSPITAL | Age: 68
Setting detail: OBSERVATION
LOS: 1 days | Discharge: HOME | DRG: 603 | End: 2023-12-19
Attending: EMERGENCY MEDICINE | Admitting: INTERNAL MEDICINE
Payer: MEDICARE

## 2023-12-17 DIAGNOSIS — L03.90 CELLULITIS: Primary | ICD-10-CM

## 2023-12-17 DIAGNOSIS — S81.801A NON-HEALING WOUND OF LOWER EXTREMITY, RIGHT, INITIAL ENCOUNTER: ICD-10-CM

## 2023-12-17 LAB
ALBUMIN SERPL BCP-MCNC: 4.1 G/DL (ref 3.4–5)
ALP SERPL-CCNC: 60 U/L (ref 33–136)
ALT SERPL W P-5'-P-CCNC: 13 U/L (ref 7–45)
ANION GAP SERPL CALC-SCNC: 13 MMOL/L (ref 10–20)
AST SERPL W P-5'-P-CCNC: 27 U/L (ref 9–39)
BASOPHILS # BLD AUTO: 0.02 X10*3/UL (ref 0–0.1)
BASOPHILS NFR BLD AUTO: 0.3 %
BILIRUB SERPL-MCNC: 0.5 MG/DL (ref 0–1.2)
BUN SERPL-MCNC: 9 MG/DL (ref 6–23)
CALCIUM SERPL-MCNC: 9 MG/DL (ref 8.6–10.3)
CHLORIDE SERPL-SCNC: 101 MMOL/L (ref 98–107)
CO2 SERPL-SCNC: 25 MMOL/L (ref 21–32)
CREAT SERPL-MCNC: 0.75 MG/DL (ref 0.5–1.05)
EOSINOPHIL # BLD AUTO: 0.12 X10*3/UL (ref 0–0.7)
EOSINOPHIL NFR BLD AUTO: 1.6 %
ERYTHROCYTE [DISTWIDTH] IN BLOOD BY AUTOMATED COUNT: 15.6 % (ref 11.5–14.5)
GFR SERPL CREATININE-BSD FRML MDRD: 87 ML/MIN/1.73M*2
GLUCOSE SERPL-MCNC: 77 MG/DL (ref 74–99)
HCT VFR BLD AUTO: 37.9 % (ref 36–46)
HGB BLD-MCNC: 12.7 G/DL (ref 12–16)
IMM GRANULOCYTES # BLD AUTO: 0.02 X10*3/UL (ref 0–0.7)
IMM GRANULOCYTES NFR BLD AUTO: 0.3 % (ref 0–0.9)
LACTATE SERPL-SCNC: 1.2 MMOL/L (ref 0.4–2)
LYMPHOCYTES # BLD AUTO: 2.42 X10*3/UL (ref 1.2–4.8)
LYMPHOCYTES NFR BLD AUTO: 31.4 %
MCH RBC QN AUTO: 32.2 PG (ref 26–34)
MCHC RBC AUTO-ENTMCNC: 33.5 G/DL (ref 32–36)
MCV RBC AUTO: 96 FL (ref 80–100)
MONOCYTES # BLD AUTO: 0.53 X10*3/UL (ref 0.1–1)
MONOCYTES NFR BLD AUTO: 6.9 %
NEUTROPHILS # BLD AUTO: 4.59 X10*3/UL (ref 1.2–7.7)
NEUTROPHILS NFR BLD AUTO: 59.5 %
NRBC BLD-RTO: 0 /100 WBCS (ref 0–0)
PLATELET # BLD AUTO: 251 X10*3/UL (ref 150–450)
POTASSIUM SERPL-SCNC: 3.8 MMOL/L (ref 3.5–5.3)
PROT SERPL-MCNC: 6.9 G/DL (ref 6.4–8.2)
RBC # BLD AUTO: 3.95 X10*6/UL (ref 4–5.2)
SODIUM SERPL-SCNC: 135 MMOL/L (ref 136–145)
WBC # BLD AUTO: 7.7 X10*3/UL (ref 4.4–11.3)

## 2023-12-17 PROCEDURE — 80053 COMPREHEN METABOLIC PANEL: CPT | Performed by: EMERGENCY MEDICINE

## 2023-12-17 PROCEDURE — 85025 COMPLETE CBC W/AUTO DIFF WBC: CPT | Performed by: EMERGENCY MEDICINE

## 2023-12-17 PROCEDURE — 96365 THER/PROPH/DIAG IV INF INIT: CPT

## 2023-12-17 PROCEDURE — 2500000001 HC RX 250 WO HCPCS SELF ADMINISTERED DRUGS (ALT 637 FOR MEDICARE OP): Performed by: INTERNAL MEDICINE

## 2023-12-17 PROCEDURE — 2500000004 HC RX 250 GENERAL PHARMACY W/ HCPCS (ALT 636 FOR OP/ED): Performed by: INTERNAL MEDICINE

## 2023-12-17 PROCEDURE — 36415 COLL VENOUS BLD VENIPUNCTURE: CPT | Performed by: EMERGENCY MEDICINE

## 2023-12-17 PROCEDURE — 73590 X-RAY EXAM OF LOWER LEG: CPT | Mod: RIGHT SIDE | Performed by: RADIOLOGY

## 2023-12-17 PROCEDURE — 87040 BLOOD CULTURE FOR BACTERIA: CPT | Mod: AHULAB | Performed by: EMERGENCY MEDICINE

## 2023-12-17 PROCEDURE — 99285 EMERGENCY DEPT VISIT HI MDM: CPT

## 2023-12-17 PROCEDURE — 96375 TX/PRO/DX INJ NEW DRUG ADDON: CPT

## 2023-12-17 PROCEDURE — 1200000002 HC GENERAL ROOM WITH TELEMETRY DAILY

## 2023-12-17 PROCEDURE — 2500000004 HC RX 250 GENERAL PHARMACY W/ HCPCS (ALT 636 FOR OP/ED): Performed by: EMERGENCY MEDICINE

## 2023-12-17 PROCEDURE — 73590 X-RAY EXAM OF LOWER LEG: CPT | Mod: RT

## 2023-12-17 PROCEDURE — A4217 STERILE WATER/SALINE, 500 ML: HCPCS | Performed by: INTERNAL MEDICINE

## 2023-12-17 PROCEDURE — 83605 ASSAY OF LACTIC ACID: CPT | Performed by: EMERGENCY MEDICINE

## 2023-12-17 RX ORDER — ACETAMINOPHEN 325 MG/1
650 TABLET ORAL EVERY 6 HOURS PRN
Status: DISCONTINUED | OUTPATIENT
Start: 2023-12-17 | End: 2023-12-18 | Stop reason: SDUPTHER

## 2023-12-17 RX ORDER — PANTOPRAZOLE SODIUM 40 MG/1
40 TABLET, DELAYED RELEASE ORAL
Status: DISCONTINUED | OUTPATIENT
Start: 2023-12-18 | End: 2023-12-17

## 2023-12-17 RX ORDER — ALUMINUM HYDROXIDE, MAGNESIUM HYDROXIDE, AND SIMETHICONE 1200; 120; 1200 MG/30ML; MG/30ML; MG/30ML
20 SUSPENSION ORAL 4 TIMES DAILY PRN
Status: DISCONTINUED | OUTPATIENT
Start: 2023-12-17 | End: 2023-12-19 | Stop reason: HOSPADM

## 2023-12-17 RX ORDER — CLINDAMYCIN PHOSPHATE 600 MG/50ML
600 INJECTION, SOLUTION INTRAVENOUS EVERY 8 HOURS
Status: DISCONTINUED | OUTPATIENT
Start: 2023-12-18 | End: 2023-12-19 | Stop reason: HOSPADM

## 2023-12-17 RX ORDER — CLINDAMYCIN PHOSPHATE 600 MG/50ML
600 INJECTION, SOLUTION INTRAVENOUS ONCE
Status: COMPLETED | OUTPATIENT
Start: 2023-12-17 | End: 2023-12-17

## 2023-12-17 RX ORDER — DOCUSATE SODIUM 100 MG/1
100 CAPSULE, LIQUID FILLED ORAL 2 TIMES DAILY
Status: DISCONTINUED | OUTPATIENT
Start: 2023-12-17 | End: 2023-12-19 | Stop reason: HOSPADM

## 2023-12-17 RX ORDER — TRAMADOL HYDROCHLORIDE 50 MG/1
50 TABLET ORAL EVERY 6 HOURS PRN
Status: DISCONTINUED | OUTPATIENT
Start: 2023-12-17 | End: 2023-12-17

## 2023-12-17 RX ORDER — PANTOPRAZOLE SODIUM 40 MG/1
40 TABLET, DELAYED RELEASE ORAL
Status: DISCONTINUED | OUTPATIENT
Start: 2023-12-18 | End: 2023-12-18 | Stop reason: SDUPTHER

## 2023-12-17 RX ORDER — TRAMADOL HYDROCHLORIDE 50 MG/1
50 TABLET ORAL EVERY 6 HOURS PRN
Status: DISCONTINUED | OUTPATIENT
Start: 2023-12-17 | End: 2023-12-19 | Stop reason: HOSPADM

## 2023-12-17 RX ORDER — CEFAZOLIN SODIUM 1 G/50ML
1 SOLUTION INTRAVENOUS EVERY 8 HOURS
Status: DISCONTINUED | OUTPATIENT
Start: 2023-12-17 | End: 2023-12-17

## 2023-12-17 RX ORDER — TALC
3 POWDER (GRAM) TOPICAL NIGHTLY PRN
Status: DISCONTINUED | OUTPATIENT
Start: 2023-12-17 | End: 2023-12-19 | Stop reason: HOSPADM

## 2023-12-17 RX ORDER — ONDANSETRON HYDROCHLORIDE 2 MG/ML
4 INJECTION, SOLUTION INTRAVENOUS EVERY 6 HOURS PRN
Status: DISCONTINUED | OUTPATIENT
Start: 2023-12-17 | End: 2023-12-17

## 2023-12-17 RX ORDER — ONDANSETRON HYDROCHLORIDE 2 MG/ML
4 INJECTION, SOLUTION INTRAVENOUS EVERY 6 HOURS PRN
Status: DISCONTINUED | OUTPATIENT
Start: 2023-12-17 | End: 2023-12-18 | Stop reason: SDUPTHER

## 2023-12-17 RX ORDER — ACETAMINOPHEN 325 MG/1
650 TABLET ORAL EVERY 6 HOURS PRN
Status: DISCONTINUED | OUTPATIENT
Start: 2023-12-17 | End: 2023-12-17

## 2023-12-17 RX ORDER — TALC
3 POWDER (GRAM) TOPICAL NIGHTLY PRN
Status: DISCONTINUED | OUTPATIENT
Start: 2023-12-17 | End: 2023-12-17

## 2023-12-17 RX ADMIN — TRAMADOL HYDROCHLORIDE 50 MG: 50 TABLET, COATED ORAL at 22:17

## 2023-12-17 RX ADMIN — CLINDAMYCIN PHOSPHATE 600 MG: 600 INJECTION, SOLUTION INTRAVENOUS at 17:36

## 2023-12-17 RX ADMIN — DEXTROSE MONOHYDRATE 1250 MG: 50 INJECTION, SOLUTION INTRAVENOUS at 22:17

## 2023-12-17 SDOH — SOCIAL STABILITY: SOCIAL INSECURITY: HAVE YOU HAD THOUGHTS OF HARMING ANYONE ELSE?: NO

## 2023-12-17 SDOH — SOCIAL STABILITY: SOCIAL INSECURITY: DOES ANYONE TRY TO KEEP YOU FROM HAVING/CONTACTING OTHER FRIENDS OR DOING THINGS OUTSIDE YOUR HOME?: NO

## 2023-12-17 SDOH — SOCIAL STABILITY: SOCIAL INSECURITY: ABUSE: ADULT

## 2023-12-17 SDOH — SOCIAL STABILITY: SOCIAL INSECURITY: ARE THERE ANY APPARENT SIGNS OF INJURIES/BEHAVIORS THAT COULD BE RELATED TO ABUSE/NEGLECT?: NO

## 2023-12-17 SDOH — SOCIAL STABILITY: SOCIAL INSECURITY: DO YOU FEEL UNSAFE GOING BACK TO THE PLACE WHERE YOU ARE LIVING?: NO

## 2023-12-17 SDOH — SOCIAL STABILITY: SOCIAL INSECURITY: ARE YOU OR HAVE YOU BEEN THREATENED OR ABUSED PHYSICALLY, EMOTIONALLY, OR SEXUALLY BY ANYONE?: NO

## 2023-12-17 SDOH — SOCIAL STABILITY: SOCIAL INSECURITY: HAS ANYONE EVER THREATENED TO HURT YOUR FAMILY OR YOUR PETS?: NO

## 2023-12-17 SDOH — SOCIAL STABILITY: SOCIAL INSECURITY: WERE YOU ABLE TO COMPLETE ALL THE BEHAVIORAL HEALTH SCREENINGS?: YES

## 2023-12-17 SDOH — SOCIAL STABILITY: SOCIAL INSECURITY: DO YOU FEEL ANYONE HAS EXPLOITED OR TAKEN ADVANTAGE OF YOU FINANCIALLY OR OF YOUR PERSONAL PROPERTY?: NO

## 2023-12-17 ASSESSMENT — LIFESTYLE VARIABLES
AUDIT-C TOTAL SCORE: 3
AUDIT-C TOTAL SCORE: 3
HOW OFTEN DO YOU HAVE A DRINK CONTAINING ALCOHOL: 2-4 TIMES A MONTH
HOW OFTEN DO YOU HAVE 6 OR MORE DRINKS ON ONE OCCASION: LESS THAN MONTHLY
HOW MANY STANDARD DRINKS CONTAINING ALCOHOL DO YOU HAVE ON A TYPICAL DAY: 1 OR 2
SKIP TO QUESTIONS 9-10: 0

## 2023-12-17 ASSESSMENT — ACTIVITIES OF DAILY LIVING (ADL)
TOILETING: INDEPENDENT
JUDGMENT_ADEQUATE_SAFELY_COMPLETE_DAILY_ACTIVITIES: YES
FEEDING YOURSELF: INDEPENDENT
PATIENT'S MEMORY ADEQUATE TO SAFELY COMPLETE DAILY ACTIVITIES?: YES
HEARING - LEFT EAR: FUNCTIONAL
WALKS IN HOME: INDEPENDENT
ADEQUATE_TO_COMPLETE_ADL: YES
LACK_OF_TRANSPORTATION: NO
GROOMING: INDEPENDENT
DRESSING YOURSELF: INDEPENDENT
HEARING - RIGHT EAR: FUNCTIONAL
BATHING: INDEPENDENT

## 2023-12-17 ASSESSMENT — PAIN SCALES - GENERAL
PAINLEVEL_OUTOF10: 5 - MODERATE PAIN
PAINLEVEL_OUTOF10: 10 - WORST POSSIBLE PAIN
PAINLEVEL_OUTOF10: 7
PAINLEVEL_OUTOF10: 4

## 2023-12-17 ASSESSMENT — COGNITIVE AND FUNCTIONAL STATUS - GENERAL
DAILY ACTIVITIY SCORE: 24
MOBILITY SCORE: 24
PATIENT BASELINE BEDBOUND: NO

## 2023-12-17 ASSESSMENT — COLUMBIA-SUICIDE SEVERITY RATING SCALE - C-SSRS
2. HAVE YOU ACTUALLY HAD ANY THOUGHTS OF KILLING YOURSELF?: NO
1. IN THE PAST MONTH, HAVE YOU WISHED YOU WERE DEAD OR WISHED YOU COULD GO TO SLEEP AND NOT WAKE UP?: NO
6. HAVE YOU EVER DONE ANYTHING, STARTED TO DO ANYTHING, OR PREPARED TO DO ANYTHING TO END YOUR LIFE?: NO

## 2023-12-17 ASSESSMENT — PAIN DESCRIPTION - LOCATION
LOCATION: LEG
LOCATION: LEG

## 2023-12-17 ASSESSMENT — PATIENT HEALTH QUESTIONNAIRE - PHQ9
1. LITTLE INTEREST OR PLEASURE IN DOING THINGS: NOT AT ALL
SUM OF ALL RESPONSES TO PHQ9 QUESTIONS 1 & 2: 1
2. FEELING DOWN, DEPRESSED OR HOPELESS: SEVERAL DAYS

## 2023-12-17 ASSESSMENT — PAIN - FUNCTIONAL ASSESSMENT
PAIN_FUNCTIONAL_ASSESSMENT: 0-10

## 2023-12-17 ASSESSMENT — PAIN DESCRIPTION - PAIN TYPE: TYPE: ACUTE PAIN

## 2023-12-17 ASSESSMENT — PAIN DESCRIPTION - ORIENTATION: ORIENTATION: RIGHT

## 2023-12-17 NOTE — ED PROVIDER NOTES
HPI   Chief Complaint   Patient presents with    Wound Check     RLE       This is a 68-year-old female who presents to the emergency department complaining of a wound on her right shin.  The patient states she struck the leg 1 week ago.  Since that time the wound has opened.  There has been drainage.  There has been surrounding redness.  She denies fevers and chills.  She denies chest pain.  She denies shortness of breath.  She reports multiple medical problems but states that many of these have improved as she has lost 170 pounds.  She denies diabetes.                          Rosario Coma Scale Score: 15                  Patient History   Past Medical History:   Diagnosis Date    Furuncle of groin 08/23/2022    Boil, groin    Hypertension     Infection and inflammatory reaction due to other internal joint prosthesis, initial encounter (CMS/[facility]) 08/23/2022    Chronic infection of knee joint prosthesis, initial encounter    Left sided colitis without complications (CMS/[facility]) 10/03/2017    Ulcerative colitis, left sided    Personal history of (healed) traumatic fracture 08/23/2022    History of compression fracture of spine    Personal history of other diseases of the circulatory system 08/23/2022    History of hypertension    Personal history of other diseases of the respiratory system 08/23/2022    History of acute sinusitis    Personal history of other diseases of urinary system 08/23/2022    History of hematuria    Personal history of transient ischemic attack (TIA), and cerebral infarction without residual deficits 08/23/2022    History of cerebrovascular accident    Unspecified cataract 08/23/2022    Cataracts, both eyes    Urinary tract infection, site not specified 08/23/2022    Acute UTI    Vitreous degeneration, left eye 08/23/2022    PVD (posterior vitreous detachment), left eye     Past Surgical History:   Procedure Laterality Date    CT HEAD ANGIO W AND WO IV CONTRAST  1/21/2019    CT HEAD ANGIO W AND WO  IV CONTRAST 1/21/2019 [facility] [place] ANCILLARY LEGACY    CT HEAD ANGIO W AND WO IV CONTRAST  6/4/2020    CT HEAD ANGIO W AND WO IV CONTRAST 6/4/2020 [facility]CILLARY [facility]GAC[facility]    MR HEAD ANGIO WO IV CONTRAST  6/3/2018    MR HEAD ANGIO WO IV CONTRAST 6/3/2018 [facility] CLINICAL LEGACY    MR HEAD ANGIO WO IV CONTRAST  8/18/2022    MR HEAD ANGIO WO IV CONTRAST 8/18/2022 [facility] [place] ANCILLARY LEGACY    MR NECK ANGIO WO IV CONTRAST  6/3/2018    MR NECK ANGIO WO IV CONTRAST 6/3/2018 [facility] CLINICAL LEGACY    OTHER SURGICAL HISTORY  08/23/2022    Appendectomy    OTHER SURGICAL HISTORY  08/23/2022    Back surgery    OTHER SURGICAL HISTORY  08/23/2022    Hip replacement    OTHER SURGICAL HISTORY  08/23/2022    Knee replacement    OTHER SURGICAL HISTORY  08/23/2022    Hand surgery     Family History   Problem Relation Name Age of Onset    Hypertension Mother      Hypertension Father      Other (Malignant Neoplasm of the Oral Cavity) Father      Hypertension Sister      Hypertension Brother       Social History     Tobacco Use    Smoking status: Every Day     Packs/day: 1.00     Years: 50.00     Additional pack years: 0.00     Total pack years: 50.00     Types: Cigarettes    Smokeless tobacco: Never   Substance Use Topics    Alcohol use: Yes    Drug use: Never       Physical Exam   ED Triage Vitals [12/17/23 1626]   Temp Heart Rate Resp BP   36.7 °C (98.1 °F) 66 18 133/81      SpO2 Temp Source Heart Rate Source Patient Position   98 % Oral Monitor Sitting      BP Location FiO2 (%)     Left arm --       Physical Exam  Vitals and nursing note reviewed.   HENT:      Head: Normocephalic and atraumatic.      Nose: Nose normal.   Eyes:      Conjunctiva/sclera: Conjunctivae normal.   Cardiovascular:      Rate and Rhythm: Normal rate and regular rhythm.      Pulses: Normal pulses.      Heart sounds: Normal heart sounds.   Pulmonary:      Effort: Pulmonary effort is normal.      Breath sounds: Normal breath sounds.   Abdominal:      General: Bowel sounds are normal.       Palpations: Abdomen is soft.   Musculoskeletal:         General: Normal range of motion.      Cervical back: Normal range of motion and neck supple.        Legs:       Comments: Quarter sized open wound with surrounding erythema.   Skin:     Findings: No rash.   Neurological:      General: No focal deficit present.      Mental Status: She is alert and oriented to person, place, and time.   Psychiatric:         Mood and Affect: Mood normal.         ED Course & MDM   Diagnoses as of 12/17/23 2238   Cellulitis   Non-healing wound of lower extremity, right, initial encounter       Medical Decision Making  Differential diagnoses considered: Cellulitis, nonhealing wound, osteomyelitis, DVT    This is a 68-year-old female who presents to the emergency department with a nonhealing wound on her right shin.  There is surrounding erythema.  The patient has multiple allergies.  She will be treated with clindamycin.  Patient's labs are unremarkable.  X-ray did not show osteomyelitis but showed a likely 2 cm abscess.  Contacted Dr. Chiang who accepted the patient for admission.        Procedure  Procedures     Arsh Mukherjee MD  12/17/23 7190

## 2023-12-18 LAB
ANION GAP SERPL CALC-SCNC: 12 MMOL/L (ref 10–20)
BUN SERPL-MCNC: 7 MG/DL (ref 6–23)
CALCIUM SERPL-MCNC: 8.2 MG/DL (ref 8.6–10.3)
CHLORIDE SERPL-SCNC: 104 MMOL/L (ref 98–107)
CO2 SERPL-SCNC: 24 MMOL/L (ref 21–32)
CREAT SERPL-MCNC: 0.69 MG/DL (ref 0.5–1.05)
GFR SERPL CREATININE-BSD FRML MDRD: >90 ML/MIN/1.73M*2
GLUCOSE SERPL-MCNC: 71 MG/DL (ref 74–99)
POTASSIUM SERPL-SCNC: 3.5 MMOL/L (ref 3.5–5.3)
SODIUM SERPL-SCNC: 136 MMOL/L (ref 136–145)

## 2023-12-18 PROCEDURE — 80048 BASIC METABOLIC PNL TOTAL CA: CPT | Performed by: INTERNAL MEDICINE

## 2023-12-18 PROCEDURE — 2500000001 HC RX 250 WO HCPCS SELF ADMINISTERED DRUGS (ALT 637 FOR MEDICARE OP): Performed by: INTERNAL MEDICINE

## 2023-12-18 PROCEDURE — 99222 1ST HOSP IP/OBS MODERATE 55: CPT | Performed by: INTERNAL MEDICINE

## 2023-12-18 PROCEDURE — 1200000002 HC GENERAL ROOM WITH TELEMETRY DAILY

## 2023-12-18 PROCEDURE — G0378 HOSPITAL OBSERVATION PER HR: HCPCS

## 2023-12-18 PROCEDURE — 36415 COLL VENOUS BLD VENIPUNCTURE: CPT | Performed by: INTERNAL MEDICINE

## 2023-12-18 PROCEDURE — 2500000004 HC RX 250 GENERAL PHARMACY W/ HCPCS (ALT 636 FOR OP/ED): Performed by: INTERNAL MEDICINE

## 2023-12-18 RX ORDER — ACETAMINOPHEN 650 MG/1
650 SUPPOSITORY RECTAL EVERY 4 HOURS PRN
Status: DISCONTINUED | OUTPATIENT
Start: 2023-12-18 | End: 2023-12-19 | Stop reason: HOSPADM

## 2023-12-18 RX ORDER — ONDANSETRON HYDROCHLORIDE 2 MG/ML
4 INJECTION, SOLUTION INTRAVENOUS EVERY 8 HOURS PRN
Status: DISCONTINUED | OUTPATIENT
Start: 2023-12-18 | End: 2023-12-19 | Stop reason: HOSPADM

## 2023-12-18 RX ORDER — ACETAMINOPHEN 160 MG/5ML
650 SOLUTION ORAL EVERY 4 HOURS PRN
Status: DISCONTINUED | OUTPATIENT
Start: 2023-12-18 | End: 2023-12-19 | Stop reason: HOSPADM

## 2023-12-18 RX ORDER — POLYETHYLENE GLYCOL 3350 17 G/17G
17 POWDER, FOR SOLUTION ORAL DAILY
Status: DISCONTINUED | OUTPATIENT
Start: 2023-12-18 | End: 2023-12-19 | Stop reason: HOSPADM

## 2023-12-18 RX ORDER — GUAIFENESIN 600 MG/1
600 TABLET, EXTENDED RELEASE ORAL EVERY 12 HOURS PRN
Status: DISCONTINUED | OUTPATIENT
Start: 2023-12-18 | End: 2023-12-19 | Stop reason: HOSPADM

## 2023-12-18 RX ORDER — TALC
3 POWDER (GRAM) TOPICAL DAILY
Status: DISCONTINUED | OUTPATIENT
Start: 2023-12-18 | End: 2023-12-18 | Stop reason: SDUPTHER

## 2023-12-18 RX ORDER — ONDANSETRON 4 MG/1
4 TABLET, FILM COATED ORAL EVERY 8 HOURS PRN
Status: DISCONTINUED | OUTPATIENT
Start: 2023-12-18 | End: 2023-12-19 | Stop reason: HOSPADM

## 2023-12-18 RX ORDER — ATORVASTATIN CALCIUM 40 MG/1
40 TABLET, FILM COATED ORAL DAILY
Status: DISCONTINUED | OUTPATIENT
Start: 2023-12-18 | End: 2023-12-19 | Stop reason: HOSPADM

## 2023-12-18 RX ORDER — ESCITALOPRAM OXALATE 10 MG/1
10 TABLET ORAL DAILY
Status: DISCONTINUED | OUTPATIENT
Start: 2023-12-18 | End: 2023-12-19 | Stop reason: HOSPADM

## 2023-12-18 RX ORDER — PANTOPRAZOLE SODIUM 40 MG/10ML
40 INJECTION, POWDER, LYOPHILIZED, FOR SOLUTION INTRAVENOUS
Status: DISCONTINUED | OUTPATIENT
Start: 2023-12-19 | End: 2023-12-19 | Stop reason: HOSPADM

## 2023-12-18 RX ORDER — CLOPIDOGREL BISULFATE 75 MG/1
75 TABLET ORAL DAILY
Status: DISCONTINUED | OUTPATIENT
Start: 2023-12-18 | End: 2023-12-19 | Stop reason: HOSPADM

## 2023-12-18 RX ORDER — ACETAMINOPHEN 325 MG/1
650 TABLET ORAL EVERY 4 HOURS PRN
Status: DISCONTINUED | OUTPATIENT
Start: 2023-12-18 | End: 2023-12-19 | Stop reason: HOSPADM

## 2023-12-18 RX ORDER — BUPROPION HYDROCHLORIDE 150 MG/1
300 TABLET ORAL EVERY MORNING
Status: DISCONTINUED | OUTPATIENT
Start: 2023-12-19 | End: 2023-12-19 | Stop reason: HOSPADM

## 2023-12-18 RX ORDER — LEVOTHYROXINE SODIUM 25 UG/1
25 TABLET ORAL DAILY
Status: DISCONTINUED | OUTPATIENT
Start: 2023-12-18 | End: 2023-12-19 | Stop reason: HOSPADM

## 2023-12-18 RX ORDER — PANTOPRAZOLE SODIUM 40 MG/1
40 TABLET, DELAYED RELEASE ORAL
Status: DISCONTINUED | OUTPATIENT
Start: 2023-12-19 | End: 2023-12-19 | Stop reason: HOSPADM

## 2023-12-18 RX ADMIN — CLOPIDOGREL 75 MG: 75 TABLET ORAL at 15:27

## 2023-12-18 RX ADMIN — CLINDAMYCIN PHOSPHATE 600 MG: 600 INJECTION, SOLUTION INTRAVENOUS at 01:46

## 2023-12-18 RX ADMIN — ATORVASTATIN CALCIUM 40 MG: 40 TABLET, FILM COATED ORAL at 15:27

## 2023-12-18 RX ADMIN — PANTOPRAZOLE SODIUM 40 MG: 40 TABLET, DELAYED RELEASE ORAL at 09:48

## 2023-12-18 RX ADMIN — CLINDAMYCIN PHOSPHATE 600 MG: 600 INJECTION, SOLUTION INTRAVENOUS at 17:46

## 2023-12-18 RX ADMIN — LEVOTHYROXINE SODIUM 25 MCG: 25 TABLET ORAL at 15:27

## 2023-12-18 RX ADMIN — CLINDAMYCIN PHOSPHATE 600 MG: 600 INJECTION, SOLUTION INTRAVENOUS at 09:48

## 2023-12-18 RX ADMIN — ESCITALOPRAM OXALATE 10 MG: 10 TABLET ORAL at 15:27

## 2023-12-18 ASSESSMENT — COGNITIVE AND FUNCTIONAL STATUS - GENERAL
MOBILITY SCORE: 24
DAILY ACTIVITIY SCORE: 24

## 2023-12-18 ASSESSMENT — ACTIVITIES OF DAILY LIVING (ADL): LACK_OF_TRANSPORTATION: NO

## 2023-12-18 ASSESSMENT — PAIN - FUNCTIONAL ASSESSMENT: PAIN_FUNCTIONAL_ASSESSMENT: 0-10

## 2023-12-18 ASSESSMENT — PAIN SCALES - GENERAL: PAINLEVEL_OUTOF10: 3

## 2023-12-18 NOTE — PROGRESS NOTES
"Vancomycin Dosing by Pharmacy- INITIAL    Julissa New is a 68 y.o. year old female who Pharmacy has been consulted for vancomycin dosing for cellulitis, skin and soft tissue. Based on the patient's indication and renal status this patient will be dosed based on a goal AUC of 400-600.     Renal function is currently stable.    Visit Vitals  /78   Pulse 62   Temp 36.7 °C (98.1 °F) (Oral)   Resp 15        Lab Results   Component Value Date    CREATININE 0.75 12/17/2023    CREATININE 0.80 10/01/2023    CREATININE 0.91 11/30/2022    CREATININE 0.80 09/30/2022    CREATININE 0.86 08/16/2022    CREATININE 0.95 06/10/2022        Patient weight is No results found for: \"PTWEIGHT\"    No results found for: \"CULTURE\"     I/O last 3 completed shifts:  In: 50 (0.9 mL/kg) [IV Piggyback:50]  Out: - (0 mL/kg)   Weight: 57.6 kg   [unfilled]    No results found for: \"PATIENTTEMP\"       Assessment/Plan     Patient will not be given a loading dose. 1250 mg every 24 hours  Will initiate vancomycin maintenance, .    This dosing regimen is predicted by [employer] to result in the following pharmacokinetic parameters:  Loading dose: N/A  Regimen: 1250 mg IV every 24 hours.  Start time: 20:57 on 12/17/2023  Exposure target: AUC24 (range)400-600 mg/L.hr   AUC24,ss: 423 mg/L.hr  Probability of AUC24 > 400: 57 %  Ctrough,ss: 11.1 mg/L  Probability of Ctrough,ss > 20: 8 %  Probability of nephrotoxicity (Lodise ANAIS 2009): 7 %      Follow-up level will be ordered on 12/19 at am lab unless clinically indicated sooner.  Will continue to monitor renal function daily while on vancomycin and order serum creatinine at least every 48 hours if not already ordered.  Follow for continued vancomycin needs, clinical response, and signs/symptoms of toxicity.       Gisselle Ramirez, PharmD       "

## 2023-12-18 NOTE — PROGRESS NOTES
Julissa New is a 68 y.o. female on day 1 of admission presenting with Cellulitis.       12/18/23 0496   Discharge Planning   Living Arrangements Spouse/significant other;Family members  ( and sister)   Support Systems Spouse/significant other;Family members   Assistance Needed independent   Type of Residence Private residence   Who is requesting discharge planning? Patient   Home or Post Acute Services None   Patient expects to be discharged to: home   Does the patient need discharge transport arranged? Yes   RoundTrip coordination needed? Yes   Has discharge transport been arranged? No   Financial Resource Strain   How hard is it for you to pay for the very basics like food, housing, medical care, and heating? Not hard   Housing Stability   In the last 12 months, was there a time when you were not able to pay the mortgage or rent on time? N   In the last 12 months, was there a time when you did not have a steady place to sleep or slept in a shelter (including now)? N   Transportation Needs   In the past 12 months, has lack of transportation kept you from medical appointments or from getting medications? no   In the past 12 months, has lack of transportation kept you from meetings, work, or from getting things needed for daily living? No   Patient Choice   Provider Choice list and CMS website (https://medicare.gov/care-compare#search) for post-acute Quality and Resource Measure Data were provided and reviewed with: Patient   Patient / Family choosing to utilize agency / facility established prior to hospitalization Yes     Spoke with patient to discuss her preferences for care. Discussed how patient manages health at home. Lives home with  and sister. Independent in all ADLS.  No home O2, dialysis, or assistive devices. Patient denies any problems getting to appointments or obtaining/affording medications.  No additional resources or needs identified.    Plan: admitted for non-healing shin wound.  Margarito shows possible 2cm abscess. Receiving IV abx at this time.  Status:inpatient  Payor: [employer]  Disposition: Home  Barrier: transition to PO abx, wound care  ADOD:   1-3 days    Zully Caban RN

## 2023-12-18 NOTE — PROGRESS NOTES
Vancomycin Dosing by Pharmacy- Cessation of Therapy    Consult to pharmacy for vancomycin dosing has been discontinued by the prescriber, pharmacy will sign off at this time.    Please call pharmacy if there are further questions or re-enter a consult if vancomycin is resumed.     Gisselle Ramirez, PharmD

## 2023-12-18 NOTE — H&P
Julissa New is a 68 y.o. female   Wound Check       Patient with a past medical history of hypertension dyslipidemia history of TIA history of ulcerative colitis was doing well until a few days ago when she accidentally dropped a jewelry box on her shin  She developed a sore on the shin which then progressed to necrotic tissue with surrounding erythema with pain  Denies any fevers or chills  Was hesitant to come to the hospital because she has so many allergies to medications      Past Medical History  Past Medical History:   Diagnosis Date    Furuncle of groin 08/23/2022    Boil, groin    Hypertension     Infection and inflammatory reaction due to other internal joint prosthesis, initial encounter (CMS/[facility]) 08/23/2022    Chronic infection of knee joint prosthesis, initial encounter    Left sided colitis without complications (CMS/[facility]) 10/03/2017    Ulcerative colitis, left sided    Personal history of (healed) traumatic fracture 08/23/2022    History of compression fracture of spine    Personal history of other diseases of the circulatory system 08/23/2022    History of hypertension    Personal history of other diseases of the respiratory system 08/23/2022    History of acute sinusitis    Personal history of other diseases of urinary system 08/23/2022    History of hematuria    Personal history of transient ischemic attack (TIA), and cerebral infarction without residual deficits 08/23/2022    History of cerebrovascular accident    Unspecified cataract 08/23/2022    Cataracts, both eyes    Urinary tract infection, site not specified 08/23/2022    Acute UTI    Vitreous degeneration, left eye 08/23/2022    PVD (posterior vitreous detachment), left eye       Surgical History  Past Surgical History:   Procedure Laterality Date    CT HEAD ANGIO W AND WO IV CONTRAST  1/21/2019    CT HEAD ANGIO W AND WO IV CONTRAST [place] [facility]    CT HEAD ANGIO W AND WO IV CONTRAST  6/4/2020    CT HEAD ANGIO W AND WO  IV CONTRAST 6/4/2020 [facility] ANCILLARY LEGACY    MR HEAD ANGIO WO IV CONTRAST  6/3/2018    MR HEAD ANGIO WO IV CONTRAST 6/3/2018 [facility] CLINICAL LEGACY    MR HEAD ANGIO WO IV CONTRAST  8/18/2022    MR HEAD ANGIO WO IV CONTRAST 8/18/2022 [facility] [place]GAC[facility]    MR NECK ANGIO WO IV CONTRAST  6/3/2018    MR NECK ANGIO WO IV CONTRAST 6/3/2018 [facility] CLINICAL LEGACY    OTHER SURGICAL HISTORY  08/23/2022    Appendectomy    OTHER SURGICAL HISTORY  08/23/2022    Back surgery    OTHER SURGICAL HISTORY  08/23/2022    Hip replacement    OTHER SURGICAL HISTORY  08/23/2022    Knee replacement    OTHER SURGICAL HISTORY  08/23/2022    Hand surgery        Social History  She reports that she has been smoking cigarettes. She has a 50.00 pack-year smoking history. She has never used smokeless tobacco. She reports current alcohol use. She reports that she does not use drugs.    Family History  Family History   Problem Relation Name Age of Onset    Hypertension Mother      Hypertension Father      Other (Malignant Neoplasm of the Oral Cavity) Father      Hypertension Sister      Hypertension Brother          Allergies  Penicillins, Tetanus immune globulin, Levofloxacin, Sulfamethoxazole-trimethoprim, Tetanus toxoid, Tetanus vaccines and toxoid, Cephalosporins, Erythromycin, Sulfa (sulfonamide antibiotics), and Tetracyclines    Review of Systems     Constitutional: not feeling poorly, no fever, no recent weight gain and no recent weight loss.   Eyes: no blurred vision and no diplopia.   ENT: no hearing loss, no tinnitus, no earache, no sore throat, no hoarseness and no swollen glands in the neck.   Cardiovascular: no chest pain, no tightness or heavy pressure, no shortness of breath, no palpitations and no lower extremity edema.   Respiratory: no cough, wheezing or shortness of breath at rest or exertion  Gastrointestinal: no change in bowel habits, no diarrhea, no constipation, no bloody stools, no nausea, no vomiting, no abdominal pain, no  signs and symptoms of ulcer disease, no mary colored stools and no intolerance to fatty foods.   Genitourinary: no urinary frequency, no dysuria, no hematuria, no burning sensation during urination, urinary stream is not smaller and urinary stream does not start and stop.   Musculoskeletal: no arthralgias, no joint stiffness, no muscle weakness, no back pain and no difficulty walking.   Skin: no rashes, no change in skin color and pigmentation, no skin lesions and no skin lumps.   Neurological: no headaches, no dizziness, no seizures, no tingling, no numbness, no signs and symptoms of stroke and no limb weakness.   Psychiatric: no confusion, no memory lapses or loss, no depression and no sleep disturbances.   Endocrine: no goiter, no thyroid disorder, no diabetes mellitus, no excessive thirst, no dry skin, no cold intolerance, no heat intolerance and no increased urinary frequency.   Hematologic/Lymphatic: is not slow to heal, does not bleed easily, does not bruise easily, no thrombophlebitis, no anemia and no history of blood transfusion.   All other systems have been reviewed and are negative for complaint.     Vitals:    12/18/23 1139   BP: 132/74   Pulse: 57   Resp: 16   Temp: 36.7 °C (98 °F)   SpO2: 93%        Scheduled medications  clindamycin, 600 mg, intravenous, q8h  docusate sodium, 100 mg, oral, BID  pantoprazole, 40 mg, oral, Daily before breakfast      Continuous medications     PRN medications  PRN medications: acetaminophen, alum-mag hydroxide-simeth, melatonin, ondansetron, traMADol    Results from last 7 days   Lab Units 12/17/23  1717   WBC AUTO x10*3/uL 7.7   HEMOGLOBIN g/dL 12.7   HEMATOCRIT % 37.9   PLATELETS AUTO x10*3/uL 251     Results from last 7 days   Lab Units 12/18/23  0611 12/17/23  1717   SODIUM mmol/L 136 135*   POTASSIUM mmol/L 3.5 3.8   CHLORIDE mmol/L 104 101   CO2 mmol/L 24 25   BUN mg/dL 7 9   CREATININE mg/dL 0.69 0.75   CALCIUM mg/dL 8.2* 9.0   PROTEIN TOTAL g/dL  --  6.9  "  BILIRUBIN TOTAL mg/dL  --  0.5   ALK PHOS U/L  --  60   ALT U/L  --  13   AST U/L  --  27   GLUCOSE mg/dL 71* 77            XR tibia fibula right 2 views   Final Result   1. Small lucency anterior to the distal right tibia which may   represent abscess. This can be further evaluated with ultrasound..   Signed by Antonio Goldstein MD      CT tibia fibula right w and wo IV contrast    (Results Pending)       Physical Exam     Constitutional   General appearance: Alert and in no acute distress.   Eyes   Inspection of eyes: Sclera and conjunctiva were normal.    Pupil exam: Pupils were equal in size. Extraocular movements were intact.   Pulmonary   Respiratory assessment: No respiratory distress, normal respiratory rhythm and effort.    Auscultation of Lungs: Clear bilateral breath sounds.   Cardiovascular   Auscultation of heart: Apical pulse normal, heart rate and rhythm normal, normal S1 and S2, no murmurs and no pericardial rub.    Exam for edema: No peripheral edema.   Abdomen   Abdominal Exam: No bruits, normal bowel sounds, soft, non-tender, no abdominal mass palpated.    Liver and Spleen exam: No hepato-splenomegaly.   Musculoskeletal   Examination of gait: Normal.    Inspection of digits and nails: No clubbing or cyanosis of the fingernails.    Inspection/palpation of joints, bones and muscles: No joint swelling. Normal movement of all extremities.   Skin   Necrotic area on shin with surrounding erythema and crepitus  Neurologic   Cranial nerves: Nerves 2-12 were intact, no focal neuro defects.   Psychiatric   Orientation: Oriented to person, place, and time.    Mood and affect: Normal.       Assessment/Plan      #Cellulitis with possible abscess and crepitus on palpation  Check CT lower extremity  Currently on IV clindamycin and Vanco  Has multiple drug\" allergies \"  Will ask pharmacy to go over the list to see what her true allergies but are not  Is also allergic to tetanus vaccine    #Hypertension  Continue " home medications

## 2023-12-18 NOTE — CARE PLAN
The patient's goals for the shift include      The clinical goals for the shift include pain managaement    Over the shift, the patient did not make progress toward the following goals. Barriers to progression include   Problem: Skin  Goal: Decreased wound size/increased tissue granulation at next dressing change  Outcome: Progressing  Goal: Participates in plan/prevention/treatment measures  Outcome: Progressing  Goal: Promote/optimize nutrition  Outcome: Progressing  Goal: Promote skin healing  Outcome: Progressing     Problem: Fall/Injury  Goal: Not fall by end of shift  Outcome: Progressing  Goal: Be free from injury by end of the shift  Outcome: Progressing  Goal: Verbalize understanding of personal risk factors for fall in the hospital  Outcome: Progressing  Goal: Verbalize understanding of risk factor reduction measures to prevent injury from fall in the home  Outcome: Progressing  Goal: Use assistive devices by end of the shift  Outcome: Progressing  Goal: Pace activities to prevent fatigue by end of the shift  Outcome: Progressing   . Recommendations to address these barriers include .

## 2023-12-18 NOTE — PROGRESS NOTES
Pharmacy Medication History Review    Julissa New is a 68 y.o. female admitted for Cellulitis. Pharmacy reviewed the patient's [id number] medications and allergies for accuracy.    The list below reflectives the updated PTA list. Please review each medication in order reconciliation for additional clarification and justification.  Medications Prior to Admission   Medication Sig Dispense Refill Last Dose    atorvastatin (Lipitor) 40 mg tablet Take 1 tablet (40 mg) by mouth once daily. 90 tablet 3 12/16/2023    buPROPion XL (Wellbutrin XL) 300 mg 24 hr tablet Take 1 tablet (300 mg) by mouth once daily in the morning. 90 tablet 3 12/16/2023    cholecalciferol (Vitamin D-3) 25 MCG (1000 UT) tablet Take 1 tablet (25 mcg) by mouth once daily.   12/16/2023    clopidogrel (Plavix) 75 mg tablet Take 1 tablet (75 mg) by mouth once daily. 90 tablet 1 12/16/2023    escitalopram (Lexapro) 10 mg tablet Take 1 tablet (10 mg) by mouth once daily. 30 tablet 1 12/16/2023    levothyroxine (LevoxyL) 25 mcg tablet Take 1 tablet (25 mcg) by mouth once daily. 90 tablet 1 12/16/2023    loperamide (Imodium A-D) 2 mg tablet Take 1 tablet (2 mg) by mouth if needed for diarrhea.       naloxone (Narcan) 4 mg/0.1 mL nasal spray Administer 1 spray (4 mg) into affected nostril(s) if needed for opioid reversal. May repeat every 2-3 minutes if needed, alternating nostrils, until medical assistance becomes available. 1 each 0     naproxen (Naprosyn) 500 mg tablet Take 1 tablet (500 mg) by mouth 2 times a day as needed for moderate pain (4 - 6).          Spoke with the patient. Patient takes Plavix every day.  Patient no longer takes Buspar, Cyclobenzaprine and Sulfasalazine       The list below reflectives the updated allergy list. Please review each documented allergy for additional clarification and justification.  Allergies  Reviewed by Manisha Centeno RN on 12/17/2023        Severity Reactions Comments    Penicillins High Anaphylaxis      Tetanus Immune Globulin High Anaphylaxis     Levofloxacin Not Specified Swelling     Sulfamethoxazole-trimethoprim Not Specified Unknown     Tetanus Toxoid Not Specified Hives     Tetanus Vaccines And Toxoid Not Specified Unknown     Cephalosporins Low Hives, Rash, Unknown     Erythromycin Low Rash     Sulfa (sulfonamide Antibiotics) Low Rash     Tetracyclines Low Rash             Below are additional concerns with the patient's PTA list.      Loan Jay CPhT

## 2023-12-18 NOTE — CONSULTS
''Infectious Disease Consult Note''        Referred by Dr Chiang  Reason For Consult: R leg cellulitis, multiple antibiotic allergies       History Of Present Illness:  Patient 68-year-old female with history of hypertension, admitted on 12/17 with right leg nonhealing wound.  She notes she developed right leg ulcer 3 weeks ago when she dropped a jewelry box on her right leg.  She did not seek medical attention and did not take antibiotic.  X-ray showed a small lucency anterior to the distal right tibia which may represent abscess.  ID is consulted for antibiotic management.      Current Antibiotic:  Vancomycin  Clindamycin    Medications:  No current facility-administered medications on file prior to encounter.     Current Outpatient Medications on File Prior to Encounter   Medication Sig Dispense Refill    atorvastatin (Lipitor) 40 mg tablet Take 1 tablet (40 mg) by mouth once daily. 90 tablet 3    buPROPion XL (Wellbutrin XL) 300 mg 24 hr tablet Take 1 tablet (300 mg) by mouth once daily in the morning. 90 tablet 3    cholecalciferol (Vitamin D-3) 25 MCG (1000 UT) tablet Take 1 tablet (25 mcg) by mouth once daily.      clopidogrel (Plavix) 75 mg tablet Take 1 tablet (75 mg) by mouth once daily. 90 tablet 1    escitalopram (Lexapro) 10 mg tablet Take 1 tablet (10 mg) by mouth once daily. 30 tablet 1    levothyroxine (LevoxyL) 25 mcg tablet Take 1 tablet (25 mcg) by mouth once daily. 90 tablet 1    loperamide (Imodium A-D) 2 mg tablet Take 1 tablet (2 mg) by mouth if needed for diarrhea.      naloxone (Narcan) 4 mg/0.1 mL nasal spray Administer 1 spray (4 mg) into affected nostril(s) if needed for opioid reversal. May repeat every 2-3 minutes if needed, alternating nostrils, until medical assistance becomes available. 1 each 0    naproxen (Naprosyn) 500 mg tablet Take 1 tablet (500 mg) by mouth 2 times a day  as needed for moderate pain (4 - 6).      [DISCONTINUED] busPIRone (Buspar) 10 mg tablet Take 1 tablet (10 mg) by mouth 3 times a day.      [DISCONTINUED] cyclobenzaprine (Flexeril) 10 mg tablet Take 1 tablet (10 mg) by mouth as needed at bedtime for muscle spasms. 30 tablet 0    [DISCONTINUED] sulfaSALAzine (Azulfidine) 500 mg tablet Take 1 tablet (500 mg) by mouth 2 times a day. 60 tablet 2    [DISCONTINUED] SUMAtriptan (Imitrex) 100 mg tablet Take 1 tablet (100 mg) by mouth 1 time if needed for migraine. May repeat in 2 hours.       Past Medical History:   Diagnosis Date    Furuncle of groin 08/23/2022    Boil, groin    Hypertension     Infection and inflammatory reaction due to other internal joint prosthesis, initial encounter (CMS/Formerly [facility]) 08/23/2022    Chronic infection of knee joint prosthesis, initial encounter    Left sided colitis without complications (CMS/Formerly [facility]) 10/03/2017    Ulcerative colitis, left sided    Personal history of (healed) traumatic fracture 08/23/2022    History of compression fracture of spine    Personal history of other diseases of the circulatory system 08/23/2022    History of hypertension    Personal history of other diseases of the respiratory system 08/23/2022    History of acute sinusitis    Personal history of other diseases of urinary system 08/23/2022    History of hematuria    Personal history of transient ischemic attack (TIA), and cerebral infarction without residual deficits 08/23/2022    History of cerebrovascular accident    Unspecified cataract 08/23/2022    Cataracts, both eyes    Urinary tract infection, site not specified 08/23/2022    Acute UTI    Vitreous degeneration, left eye 08/23/2022    PVD (posterior vitreous detachment), left eye     Past Surgical History:   Procedure Laterality Date    CT HEAD ANGIO W AND WO IV CONTRAST  1/21/2019    CT HEAD ANGIO W AND WO IV CONTRAST 1/21/2019 [facility]    CT HEAD ANGIO W AND WO IV CONTRAST  6/4/2020    CT HEAD ANGIO  W AND WO IV CONTRAST 6/4/2020 [facility] ANCILLARY LEGACY    MR HEAD ANGIO WO IV CONTRAST  6/3/2018    MR HEAD ANGIO WO IV CONTRAST 6/3/2018 [facility] CLINICAL LEGACY    MR HEAD ANGIO WO IV CONTRAST  8/18/2022    MR HEAD ANGIO WO IV CONTRAST 8/18/2022 [facility] ANCILLARY LEGACY    MR NECK ANGIO WO IV CONTRAST  6/3/2018    MR NECK ANGIO WO IV CONTRAST 6/3/2018 [facility] CLINICAL LEGACY    OTHER SURGICAL HISTORY  08/23/2022    Appendectomy    OTHER SURGICAL HISTORY  08/23/2022    Back surgery    OTHER SURGICAL HISTORY  08/23/2022    Hip replacement    OTHER SURGICAL HISTORY  08/23/2022    Knee replacement    OTHER SURGICAL HISTORY  08/23/2022    Hand surgery     Social History     Socioeconomic History    Marital status:      Spouse name: Not on file    Number of children: Not on file    Years of education: Not on file    Highest education level: Not on file   Occupational History    Not on file   Tobacco Use    Smoking status: Every Day     Packs/day: 1.00     Years: 50.00     Additional pack years: 0.00     Total pack years: 50.00     Types: Cigarettes    Smokeless tobacco: Never   Substance and Sexual Activity    Alcohol use: Yes    Drug use: Never    Sexual activity: Not on file   Other Topics Concern    Not on file   Social History Narrative    Not on file     Social Determinants of Health     Financial Resource Strain: Medium Risk (12/17/2023)    Overall Financial Resource Strain (CARDIA)     Difficulty of Paying Living Expenses: Somewhat hard   Food Insecurity: Not on file   Transportation Needs: No Transportation Needs (12/17/2023)    PRAPARE - Transportation     Lack of Transportation (Medical): No     Lack of Transportation (Non-Medical): No   Physical Activity: Not on file   Stress: Not on file   Social Connections: Not on file   Intimate Partner Violence: Not on file   Housing Stability: Low Risk  (12/17/2023)    Housing Stability Vital Sign     Unable to Pay for Housing in the Last Year: No     Number of Places Lived in  "the Last Year: 1     Unstable Housing in the Last Year: No     Family History   Problem Relation Name Age of Onset    Hypertension Mother      Hypertension Father      Other (Malignant Neoplasm of the Oral Cavity) Father      Hypertension Sister      Hypertension Brother         Allergies   Allergen Reactions    Penicillins Anaphylaxis    Tetanus Immune Globulin Anaphylaxis    Levofloxacin Swelling    Sulfamethoxazole-Trimethoprim Unknown    Tetanus Toxoid Hives    Tetanus Vaccines And Toxoid Unknown    Cephalosporins Hives, Rash and Unknown    Erythromycin Rash    Sulfa (Sulfonamide Antibiotics) Rash    Tetracyclines Rash       Review of Systems:   General: no fevers, chills  Skin: Right leg wound  Head: no headache  ENT: no sore throat   Chest: no chest pain   Resp: no cough, or sob  GI: no nausea, vomiting, or diarrhea   : no dysuria, frequency or hematuria   Ext: no edema       Physical Exam:  /74 (BP Location: Left arm, Patient Position: Sitting)   Pulse 57   Temp 36.7 °C (98 °F) (Temporal)   Resp 16   Ht 1.727 m (5' 8\")   Wt 57.6 kg (127 lb)   SpO2 93%   BMI 19.31 kg/m²   General: NAD, nontoxic appearing  Skin: no rashes or wounds  Eyes: no scleral icterus  ENT: no oral thrush or lesions  Resp: lungs CTA b/l  CV:  normal S1/S2, no murmur   Abd: soft, non-tender  Back: no CVA tenderness   Ext: Right leg ulcer with mild surrounding erythema  Neuro: AAOx3       Lab:  Lab Results   Component Value Date    WBC 7.7 12/17/2023    HGB 12.7 12/17/2023    HCT 37.9 12/17/2023    MCV 96 12/17/2023     12/17/2023      Results from last 72 hours   Lab Units 12/18/23  0611   SODIUM mmol/L 136   POTASSIUM mmol/L 3.5   CHLORIDE mmol/L 104   CO2 mmol/L 24   BUN mg/dL 7   CREATININE mg/dL 0.69   GLUCOSE mg/dL 71*   CALCIUM mg/dL 8.2*   ANION GAP mmol/L 12   EGFR mL/min/1.73m*2 >90     Results from last 72 hours   Lab Units 12/17/23  1717   ALK PHOS U/L 60   BILIRUBIN TOTAL mg/dL 0.5   PROTEIN TOTAL g/dL 6.9 " "  ALT U/L 13   AST U/L 27   ALBUMIN g/dL 4.1     Estimated Creatinine Clearance: 71.9 mL/min (by C-G formula based on SCr of 0.69 mg/dL).  CRP   Date/Time Value Ref Range Status   09/25/2020 03:07 PM 0.76 mg/dL Final     Comment:     REF VALUE  < 1.00     06/19/2019 07:35 AM 4.79 (A) mg/dL Final     Comment:     REF VALUE  < 1.00     05/22/2019 12:00 AM 3.01 (A) mg/dL Final     Comment:     REF VALUE  < 1.00       Sedimentation Rate   Date/Time Value Ref Range Status   09/25/2020 03:07 PM 10 0 - 30 mm/h Final   04/24/2019 01:30 PM 53 (H) 0 - 30 mm/h Final   03/27/2019 02:36 PM 57 (H) 0 - 30 mm/h Final     No results found for: \"HIV1X2\", \"HIVCONF\", \"ZDFAZA5VK\"  No results found for: \"HCVPCRQUANT\"      Cultures/Micro:  No results found for the last 14 days.         Imaging: reviewed       Assessment:  Patient 68-year-old female with history of hypertension, admitted on 12/17 with right leg nonhealing wound.      -Right leg traumatic nonhealing ulcer with mild cellulitis/abscess?  -Multiple antibiotic allergies      Plan/Recommendations:  -Stop vancomycin  -Continue clindamycin  -Ortho consult  -Wound care      Please call ID with any concerns or questions.   Discussed with patient    Scooby Prince MD  [facility] [place]  #[phone]      "

## 2023-12-19 ENCOUNTER — APPOINTMENT (OUTPATIENT)
Dept: RADIOLOGY | Facility: HOSPITAL | Age: 68
DRG: 603 | End: 2023-12-19
Payer: MEDICARE

## 2023-12-19 ENCOUNTER — APPOINTMENT (OUTPATIENT)
Dept: CARDIOLOGY | Facility: HOSPITAL | Age: 68
DRG: 603 | End: 2023-12-19
Payer: MEDICARE

## 2023-12-19 VITALS
TEMPERATURE: 98.9 F | BODY MASS INDEX: 19.25 KG/M2 | SYSTOLIC BLOOD PRESSURE: 117 MMHG | DIASTOLIC BLOOD PRESSURE: 77 MMHG | HEIGHT: 68 IN | RESPIRATION RATE: 18 BRPM | OXYGEN SATURATION: 93 % | WEIGHT: 127 LBS | HEART RATE: 68 BPM

## 2023-12-19 LAB
ANION GAP SERPL CALC-SCNC: 12 MMOL/L (ref 10–20)
BUN SERPL-MCNC: 8 MG/DL (ref 6–23)
CALCIUM SERPL-MCNC: 8.4 MG/DL (ref 8.6–10.3)
CHLORIDE SERPL-SCNC: 104 MMOL/L (ref 98–107)
CO2 SERPL-SCNC: 27 MMOL/L (ref 21–32)
CREAT SERPL-MCNC: 0.74 MG/DL (ref 0.5–1.05)
ERYTHROCYTE [DISTWIDTH] IN BLOOD BY AUTOMATED COUNT: 15.7 % (ref 11.5–14.5)
GFR SERPL CREATININE-BSD FRML MDRD: 89 ML/MIN/1.73M*2
GLUCOSE SERPL-MCNC: 77 MG/DL (ref 74–99)
HCT VFR BLD AUTO: 35.6 % (ref 36–46)
HGB BLD-MCNC: 11.9 G/DL (ref 12–16)
MCH RBC QN AUTO: 31.4 PG (ref 26–34)
MCHC RBC AUTO-ENTMCNC: 33.4 G/DL (ref 32–36)
MCV RBC AUTO: 94 FL (ref 80–100)
NRBC BLD-RTO: 0 /100 WBCS (ref 0–0)
PLATELET # BLD AUTO: 237 X10*3/UL (ref 150–450)
POTASSIUM SERPL-SCNC: 4.2 MMOL/L (ref 3.5–5.3)
RBC # BLD AUTO: 3.79 X10*6/UL (ref 4–5.2)
SODIUM SERPL-SCNC: 139 MMOL/L (ref 136–145)
WBC # BLD AUTO: 7 X10*3/UL (ref 4.4–11.3)

## 2023-12-19 PROCEDURE — 2550000001 HC RX 255 CONTRASTS

## 2023-12-19 PROCEDURE — G0378 HOSPITAL OBSERVATION PER HR: HCPCS

## 2023-12-19 PROCEDURE — 99239 HOSP IP/OBS DSCHRG MGMT >30: CPT | Performed by: INTERNAL MEDICINE

## 2023-12-19 PROCEDURE — 36415 COLL VENOUS BLD VENIPUNCTURE: CPT | Performed by: INTERNAL MEDICINE

## 2023-12-19 PROCEDURE — 99222 1ST HOSP IP/OBS MODERATE 55: CPT | Performed by: NURSE PRACTITIONER

## 2023-12-19 PROCEDURE — 93005 ELECTROCARDIOGRAM TRACING: CPT

## 2023-12-19 PROCEDURE — 2500000004 HC RX 250 GENERAL PHARMACY W/ HCPCS (ALT 636 FOR OP/ED): Performed by: INTERNAL MEDICINE

## 2023-12-19 PROCEDURE — 73702 CT LWR EXTREMITY W/O&W/DYE: CPT | Mod: RT

## 2023-12-19 PROCEDURE — 2500000001 HC RX 250 WO HCPCS SELF ADMINISTERED DRUGS (ALT 637 FOR MEDICARE OP): Performed by: INTERNAL MEDICINE

## 2023-12-19 PROCEDURE — 85027 COMPLETE CBC AUTOMATED: CPT | Performed by: INTERNAL MEDICINE

## 2023-12-19 PROCEDURE — 80048 BASIC METABOLIC PNL TOTAL CA: CPT | Performed by: PHARMACIST

## 2023-12-19 RX ORDER — CLINDAMYCIN HYDROCHLORIDE 300 MG/1
300 CAPSULE ORAL 4 TIMES DAILY
Qty: 28 CAPSULE | Refills: 0 | Status: SHIPPED | OUTPATIENT
Start: 2023-12-19 | End: 2023-12-26

## 2023-12-19 RX ADMIN — ESCITALOPRAM OXALATE 10 MG: 10 TABLET ORAL at 09:04

## 2023-12-19 RX ADMIN — IOHEXOL 75 ML: 350 INJECTION, SOLUTION INTRAVENOUS at 02:25

## 2023-12-19 RX ADMIN — CLOPIDOGREL 75 MG: 75 TABLET ORAL at 09:04

## 2023-12-19 RX ADMIN — PANTOPRAZOLE SODIUM 40 MG: 40 TABLET, DELAYED RELEASE ORAL at 09:04

## 2023-12-19 RX ADMIN — LEVOTHYROXINE SODIUM 25 MCG: 25 TABLET ORAL at 09:04

## 2023-12-19 RX ADMIN — CLINDAMYCIN PHOSPHATE 600 MG: 600 INJECTION, SOLUTION INTRAVENOUS at 09:04

## 2023-12-19 RX ADMIN — CLINDAMYCIN PHOSPHATE 600 MG: 600 INJECTION, SOLUTION INTRAVENOUS at 01:00

## 2023-12-19 RX ADMIN — BUPROPION HYDROCHLORIDE 300 MG: 150 TABLET, FILM COATED, EXTENDED RELEASE ORAL at 09:04

## 2023-12-19 RX ADMIN — ATORVASTATIN CALCIUM 40 MG: 40 TABLET, FILM COATED ORAL at 09:04

## 2023-12-19 ASSESSMENT — ENCOUNTER SYMPTOMS
SEIZURES: 0
NAUSEA: 0
VOMITING: 0
FEVER: 0
PALPITATIONS: 0
APPETITE CHANGE: 0
WOUND: 1
CONSTIPATION: 0
CHILLS: 0
ABDOMINAL PAIN: 0
WHEEZING: 0
SHORTNESS OF BREATH: 0
DIARRHEA: 0
NUMBNESS: 0
DIZZINESS: 0
ACTIVITY CHANGE: 0
DIFFICULTY URINATING: 0

## 2023-12-19 ASSESSMENT — COGNITIVE AND FUNCTIONAL STATUS - GENERAL
MOBILITY SCORE: 24
DAILY ACTIVITIY SCORE: 24

## 2023-12-19 ASSESSMENT — PAIN SCALES - GENERAL: PAINLEVEL_OUTOF10: 0 - NO PAIN

## 2023-12-19 NOTE — DISCHARGE SUMMARY
Discharge Diagnosis  Cellulitis    Issues Requiring Follow-Up  Follow-up with PCP    Discharge Meds     Your medication list        START taking these medications        Instructions Last Dose Given Next Dose Due   clindamycin 300 mg capsule  Commonly known as: Cleocin      Take 1 capsule (300 mg) by mouth 4 times a day for 7 days.              CONTINUE taking these medications        Instructions Last Dose Given Next Dose Due   atorvastatin 40 mg tablet  Commonly known as: Lipitor      Take 1 tablet (40 mg) by mouth once daily.       buPROPion  mg 24 hr tablet  Commonly known as: Wellbutrin XL      Take 1 tablet (300 mg) by mouth once daily in the morning.       cholecalciferol 25 MCG (1000 UT) tablet  Commonly known as: Vitamin D-3           clopidogrel 75 mg tablet  Commonly known as: Plavix      Take 1 tablet (75 mg) by mouth once daily.       escitalopram 10 mg tablet  Commonly known as: Lexapro      Take 1 tablet (10 mg) by mouth once daily.       Imodium A-D 2 mg tablet  Generic drug: loperamide           levothyroxine 25 mcg tablet  Commonly known as: LevoxyL      Take 1 tablet (25 mcg) by mouth once daily.       naloxone 4 mg/0.1 mL nasal spray  Commonly known as: Narcan      Administer 1 spray (4 mg) into affected nostril(s) if needed for opioid reversal. May repeat every 2-3 minutes if needed, alternating nostrils, until medical assistance becomes available.       naproxen 500 mg tablet  Commonly known as: Naprosyn                     Where to Get Your Medications        These medications were sent to [id number] [place] [id number] [address] [address]      Phone: [phone]   clindamycin 300 mg capsule         Test Results Pending At Discharge  Pending Labs       Order Current Status    Blood Culture Preliminary result    Blood Culture Preliminary result            Hospital Course  Patient with a past medical history of hypertension dyslipidemia history of  TIA history of ulcerative colitis was doing well until a few days ago when she accidentally dropped a jewelry box on her shin  She developed a sore on the shin which then progressed to necrotic tissue with surrounding erythema with pain  Denies any fevers or chills  Started on IV clindamycin with good improvement  Recurrent CAT scan of the lower extremity done because of abscess/crepitations  Imaging suggested possible knee involvement  Orthopedic consult was obtained and they cleared the patient for discharge  Will switch to oral clindamycin  Recommend she follow-up with her primary care doctor and orthopedics as an outpatient    Discharge diagnosis  Cellulitis with skin breakdown    Pertinent Physical Exam At Time of Discharge  Physical Exam    Constitutional   General appearance: Alert and in no acute distress.     Pulmonary   Respiratory assessment: No respiratory distress, normal respiratory rhythm and effort.    Auscultation of Lungs: Clear bilateral breath sounds.   Cardiovascular   Auscultation of heart: Apical pulse normal, heart rate and rhythm normal, normal S1 and S2, no murmurs and no pericardial rub.    Exam for edema: No peripheral edema.   Abdomen   Abdominal Exam: No bruits, normal bowel sounds, soft, non-tender, no abdominal mass palpated.    Liver and Spleen exam: No hepato-splenomegaly.   Musculoskeletal   Examination of gait: Normal.    Inspection of digits and nails: No clubbing or cyanosis of the fingernails.    Inspection/palpation of joints, bones and muscles: No joint swelling. Normal movement of all extremities.   Skin   Skin breakdown right shin  Neurologic   Cranial nerves: Nerves 2-12 were intact, no focal neuro defects.       Outpatient Follow-Up  Future Appointments   Date Time Provider Department Center   12/27/2023  3:30 PM Al Veliz MD [id number] [facility]   1/10/2024  1:00 PM Radha Gil MD [id number] [facility]   3/15/2024  1:00 PM Yarely Mitchell DO [id number] [facility]    6/24/2024 10:15 AM Foreign Martino MD [id number] [facility]       Patient seen at bedside. Events from the last visit reviewed. Discussed with staff. Results of tests and investigations from last visit reviewed and discussed with patient/Family. Electronic chart on [facility] reviewed. Input / Recommendations  from consultants  appreciated and reviewed and agreed with.     discharge summary and profile completed. medications reviewed and discussed with patient and family.  scripts completed and signed.     total discharge time in excess of 30 minutes.    Merlin Chiang MD

## 2023-12-19 NOTE — CONSULTS
Wound Care Consult     Visit Date: 12/19/2023      Patient Name: Julissa eNw         MRN: [id number]           YOB: 1955     Reason for Consult: Assessment completed. Additional supplies left at bedside. Co-visit with podiatry student.  Patient would benefit from follow up with outpatient wound care.  List of  outpatient wound care center and phone numbers left at bedside.      Wound History: approximately 3 weeks old trauma injury.     Pertinent Labs:   Albumin   Date Value Ref Range Status   12/17/2023 4.1 3.4 - 5.0 g/dL Final       Wound Assessment:  Wound 12/19/23 Traumatic Pretibial Distal;Right;Lateral (Active)   Wound Image   12/19/23 1012   Site Assessment Red;Yellow 12/19/23 1012   Janey-Wound Assessment Ecchymotic 12/19/23 1012   Non-staged Wound Description Full thickness 12/19/23 1012   Shape Irregular 12/19/23 1012   Wound Length (cm) 3 cm 12/19/23 1012   Wound Width (cm) 2 cm 12/19/23 1012   Wound Surface Area (cm^2) 6 cm^2 12/19/23 1012   Wound Depth (cm) 0.4 cm 12/19/23 1012   Wound Volume (cm^3) 2.4 cm^3 12/19/23 1012   State of Healing Slough 12/19/23 1012   Margins Well-defined edges 12/19/23 1012   Drainage Description Serosanguineous 12/19/23 1012   Drainage Amount Small 12/19/23 1012   Dressing Changed Changed 12/19/23 1012   Dressing Status Removed;Clean 12/19/23 1012       Wound Team Summary Assessment: Notified Danielle Rahman CNP of wound care recommendations.     Right lateral LE- full thickness trauma injury   Irrigate with normal saline only, Pat dry.  Apply no sting skin barrier (cavilon) to janey wound   Apply Santyl ointment (Collagenase) Apply nickel thick over necrotic tissue  Apply hydrofera blue ready (apply writing facing up)   Change every day and as needed.       Wound Team Plan: Please re-consult wound/ostomy care for any changes or concerns    While in bed patient should only be on one fitted sheet, and one chux. Please, do not use brief while patient is  resting in bed. Elevate heels off the bed surface at all times. Turn and reposition at least every 2 hours.     Thank you for this consultations, while inpatient please contact with any questions or changes in condition.        Shannon Montelongo RN BSN,[facility],CWOCN  [phone]   12/19/2023  2:48 PM

## 2023-12-19 NOTE — PROGRESS NOTES
Julissa New is a 68 y.o. female on day 2 of admission presenting with Cellulitis.    Plan: continues treatment for nonhealing shin wound, Wound care nurse consulted to see patient. Ortho team also consulted.   Disposition: home with spouse/sister  Barrier: Iv abx, ortho plan  ADOD:  1-2 days    Zully Caban RN

## 2023-12-19 NOTE — CONSULTS
Inpatient consult to Orthopaedic Surgery  Consult performed by: MIRELA Moraes  Consult ordered by: SANDY Perez-TEREZA          Reason For Consult  Right leg cellulitis    History Of Present Illness  Julissa New is a 68 y.o. female presenting with past medical history of hypertension, PJI to left knee, colitis, tia, uti who dropped a jewelry onto her right shin. She developed a cellulitis, being treated with clindamycin 600mg Q 8 hours IV per ID and dc on PO. Ct showing Thin lenticular superficial abscess (2.1 x 0.6 x 3.1) in the anterolateral shin without evidence of deep compartment infection or osteomyelitis.Moderate size knee joint effusion with synovial enhancement and thickening could be reactive or infectious. Consider joint aspiration if there are concerns for joint infection. She denies fever, chills, states she thinks its looking better. Wound care just did a debridement with dressing application.     Past Medical History  She has a past medical history of Furuncle of groin (08/23/2022), Hypertension, Infection and inflammatory reaction due to other internal joint prosthesis, initial encounter (CMS/[facility]) (08/23/2022), Left sided colitis without complications (CMS/[facility]) (10/03/2017), Personal history of (healed) traumatic fracture (08/23/2022), Personal history of other diseases of the circulatory system (08/23/2022), Personal history of other diseases of the respiratory system (08/23/2022), Personal history of other diseases of urinary system (08/23/2022), Personal history of transient ischemic attack (TIA), and cerebral infarction without residual deficits (08/23/2022), Unspecified cataract (08/23/2022), Urinary tract infection, site not specified (08/23/2022), and Vitreous degeneration, left eye (08/23/2022).    Surgical History  She has a past surgical history that includes Other surgical history (08/23/2022); Other surgical history (08/23/2022); Other surgical history  (08/23/2022); Other surgical history (08/23/2022); Other surgical history (08/23/2022); MR angio head wo IV contrast (6/3/2018); MR angio neck wo IV contrast (6/3/2018); CT angio head w and wo IV contrast (1/21/2019); CT angio head w and wo IV contrast (6/4/2020); and MR angio head wo IV contrast (8/18/2022).     Social History  She reports that she has been smoking cigarettes. She has a 50.00 pack-year smoking history. She has never used smokeless tobacco. She reports current alcohol use. She reports that she does not use drugs.    Family History  Family History   Problem Relation Name Age of Onset    Hypertension Mother      Hypertension Father      Other (Malignant Neoplasm of the Oral Cavity) Father      Hypertension Sister      Hypertension Brother          Allergies  Penicillins, Tetanus immune globulin, Tetanus toxoid, Tetanus vaccines and toxoid, Levofloxacin, Erythromycin, Sulfa (sulfonamide antibiotics), Sulfamethoxazole-trimethoprim, and Tetracyclines    Review of Systems   Constitutional:  Negative for activity change, appetite change, chills and fever.   Respiratory:  Negative for shortness of breath and wheezing.    Cardiovascular:  Negative for palpitations and leg swelling.   Gastrointestinal:  Negative for abdominal pain, constipation, diarrhea, nausea and vomiting.   Genitourinary:  Negative for difficulty urinating.   Skin:  Positive for wound. Negative for rash.   Neurological:  Negative for dizziness, seizures, syncope and numbness.        Physical Exam  Cardiovascular:      Pulses: Normal pulses.   Pulmonary:      Effort: Pulmonary effort is normal.   Abdominal:      Palpations: Abdomen is soft.   Musculoskeletal:      Comments: Right Lower Extremity:   -right distal lateral tibia region with wound, mild erythema, no active drainage or fluctuant pocket, recently debrided today  -Tender at site of injury   -Fires DF/PF/EHL/FHL  -SILT in saph/sural/SPN/DPN distributions  -Foot warm, well  "perfused  -Palpable DP pulse, brisk cap refill  -Compartments soft and compressible     Skin:     General: Skin is warm.      Capillary Refill: Capillary refill takes less than 2 seconds.   Neurological:      General: No focal deficit present.      Mental Status: She is alert.   Psychiatric:         Mood and Affect: Mood normal.          Last Recorded Vitals  Blood pressure 107/70, pulse 61, temperature 37.2 °C (98.9 °F), temperature source Temporal, resp. rate 16, height 1.727 m (5' 8\"), weight 57.6 kg (127 lb), SpO2 95 %.    Relevant Results    CT tibia fibula right w and wo IV contrast    Result Date: 12/19/2023  Interpreted By:  Miriam Cueto, STUDY: CT TIBIA FIBULA RIGHT W AND WO IV CONTRAST;  12/19/2023 2:31 am   INDICATION: Crepitus, cellulitis.   COMPARISON: Tibia and fibula radiographs 12/17/2023.   ACCESSION NUMBER(S): [id number]   ORDERING CLINICIAN: ROSE MARIE BACA   TECHNIQUE: CT imaging of the  right tibia/fibula was obtained  with administration of 75 mL of Omnipaque 350 intravenous contrast medium. Coronal and sagittal reformatted images were performed.   FINDINGS: OSSEOUS STRUCTURES: Redemonstrated right total knee arthroplasty. Hardware is intact and well aligned without periprosthetic fracture or lucency. Partially imaged moderate-sized knee effusion with synovial enhancement and thickening. No acute fracture. No suspicious osseous lesions. No osseous destruction. Osteopenia.   SOFT TISSUES: There is a skin thickening and a thin lenticular shaped superficial subcutaneous fluid collection measuring 2.1 x 0.6 cm in the axial dimension and 3.1 cm in the craniocaudal dimension. There is adjacent subcutaneous edema and inflammation without additional fluid collection. The intermuscular fascial planes are maintained. There is mile circumferential subcutaneous edema in the mid to distal calf and ankle. Muscle bulk is maintained. No suspicious soft tissue mass.       Thin lenticular superficial " abscess (2.1 x 0.6 x 3.1) in the anterolateral shin without evidence of deep compartment infection or osteomyelitis.   Moderate size knee joint effusion with synovial enhancement and thickening could be reactive or infectious. Consider joint aspiration if there are concerns for joint infection.   MACRO: Critical Finding:  See findings. Notification was initiated on 12/19/2023 at 11:36 am by  Miriam Cueto.  (**-OCF-**) Instructions:   Signed by: Miriam Cueto 12/19/2023 11:36 AM Dictation workstation:   [id number]    XR tibia fibula right 2 views    Result Date: 12/17/2023  STUDY: Tibia and Fibula Radiographs; 12/17/2023 at 5:24 PM INDICATION: Right lower leg pain. Evaluate for infection. COMPARISON: None Available. ACCESSION NUMBER(S): [id number] ORDERING CLINICIAN: TOYA BURRELL TECHNIQUE:  Two view(s) (three images) of the right tibia and fibula. FINDINGS:  There is no displaced fracture.  The alignment is anatomic.  There is a soft tissue lucency anterior to the distal tibia measuring 2.0 x 1.0 cm..    1. Small lucency anterior to the distal right tibia which may represent abscess. This can be further evaluated with ultrasound.. Signed by Antonio Goldstein MD     Scheduled medications  atorvastatin, 40 mg, oral, Daily  buPROPion XL, 300 mg, oral, q AM  clindamycin, 600 mg, intravenous, q8h  clopidogrel, 75 mg, oral, Daily  docusate sodium, 100 mg, oral, BID  escitalopram, 10 mg, oral, Daily  levothyroxine, 25 mcg, oral, Daily  pantoprazole, 40 mg, oral, Daily before breakfast   Or  pantoprazole, 40 mg, intravenous, Daily before breakfast  polyethylene glycol, 17 g, oral, Daily  psyllium, 1 packet, oral, Daily      Continuous medications     PRN medications  PRN medications: acetaminophen **OR** acetaminophen **OR** acetaminophen, alum-mag hydroxide-simeth, guaiFENesin, melatonin, ondansetron **OR** ondansetron, traMADol  Results for orders placed or performed during the hospital encounter of 12/17/23  (from the past 24 hour(s))   Basic Metabolic Panel   Result Value Ref Range    Glucose 77 74 - 99 mg/dL    Sodium 139 136 - 145 mmol/L    Potassium 4.2 3.5 - 5.3 mmol/L    Chloride 104 98 - 107 mmol/L    Bicarbonate 27 21 - 32 mmol/L    Anion Gap 12 10 - 20 mmol/L    Urea Nitrogen 8 6 - 23 mg/dL    Creatinine 0.74 0.50 - 1.05 mg/dL    eGFR 89 >60 mL/min/1.73m*2    Calcium 8.4 (L) 8.6 - 10.3 mg/dL   CBC   Result Value Ref Range    WBC 7.0 4.4 - 11.3 x10*3/uL    nRBC 0.0 0.0 - 0.0 /100 WBCs    RBC 3.79 (L) 4.00 - 5.20 x10*6/uL    Hemoglobin 11.9 (L) 12.0 - 16.0 g/dL    Hematocrit 35.6 (L) 36.0 - 46.0 %    MCV 94 80 - 100 fL    MCH 31.4 26.0 - 34.0 pg    MCHC 33.4 32.0 - 36.0 g/dL    RDW 15.7 (H) 11.5 - 14.5 %    Platelets 237 150 - 450 x10*3/uL        Assessment/Plan     Cellulitis with wound:  -no acute ortho interventions warranted  -improving cellulitis with recent wound care debridement  -ID   -wound care team   -follow up outpatient with wound care and orthopedic surgeon who did right TKA  -ortho to sign off, call with any questions or concerns  -discussed with dr ambrose  I spent 60 minutes with this patient and/or family.  Greater than 50% of this time was spent in counseling and/or coordination of care.      Nori Vidal, APRN-CNP

## 2023-12-19 NOTE — PROGRESS NOTES
"                                                                                                               '' Infectious Disease Progress Note''        Interval Events:  No new symptoms  Afebrile  Blood culture no growth to date      Current antibiotic:  Clindamycin    Physical Exam:  /73 (BP Location: Right arm, Patient Position: Lying)   Pulse 53   Temp 36.9 °C (98.5 °F) (Temporal)   Resp 16   Ht 1.727 m (5' 8\")   Wt 57.6 kg (127 lb)   SpO2 94%   BMI 19.31 kg/m²   General: NAD, nontoxic appearing  Skin: no new rash  Resp: lungs CTA b/l  CV:  normal S1/S2, no murmur   Abd: soft, non-tender  Ext:  Right leg ulcer with mild surrounding erythema       Lab Results:  Reviewed    Micro:  12/17 blood culture: No growth to date    Imaging: reviewed         Assessment:  Patient 68-year-old female with history of hypertension, admitted on 12/17 with right leg nonhealing wound.       -Right leg traumatic nonhealing ulcer with mild cellulitis/abscess?  -Multiple antibiotic allergies        Plan/Recommendations:  -Continue clindamycin D2/7   -Okay to discharge on clindamycin p.o. thru 12/24/23  -Wound care      Will sign off.  Please call ID with any concerns or questions.      Scooby Prince MD  [facility]  #[phone]      "

## 2023-12-19 NOTE — DISCHARGE INSTRUCTIONS
Irrigate with normal saline only, Pat dry.  Apply no sting skin barrier (cavilon) to janey wound   Apply Santyl ointment (Collagenase) Apply nickel thick over necrotic tissue  Apply hydrofera blue ready (apply writing facing up)   Change every day and as needed.    A referral to the wound clinic has also been placed. They should be calling you to schedule an appointment, if you do not hear anything in the next 1-2 days please call [phone] to schedule    You will also need a follow up appointment with your PCP in 1 week

## 2023-12-20 ENCOUNTER — DOCUMENTATION (OUTPATIENT)
Dept: PRIMARY CARE | Facility: CLINIC | Age: 68
End: 2023-12-20
Payer: MEDICARE

## 2023-12-20 NOTE — PROGRESS NOTES
Collaborative Care services closing due to no contact/communication with patient.  Pt last appointment was 9/14/23; no showed visit for 9/28/23.

## 2023-12-22 LAB
BACTERIA BLD CULT: NORMAL
BACTERIA BLD CULT: NORMAL

## 2023-12-27 ENCOUNTER — PROCEDURE VISIT (OUTPATIENT)
Dept: UROLOGY | Facility: CLINIC | Age: 68
End: 2023-12-27
Payer: MEDICARE

## 2023-12-27 VITALS — BODY MASS INDEX: 20.6 KG/M2 | HEIGHT: 66 IN | WEIGHT: 128.2 LBS

## 2023-12-27 DIAGNOSIS — Z79.2 NEED FOR PROPHYLACTIC ANTIBIOTIC: ICD-10-CM

## 2023-12-27 DIAGNOSIS — R31.0 GROSS HEMATURIA: ICD-10-CM

## 2023-12-27 DIAGNOSIS — C67.2 MALIGNANT NEOPLASM OF LATERAL WALL OF URINARY BLADDER (MULTI): Primary | ICD-10-CM

## 2023-12-27 LAB
POC APPEARANCE, URINE: CLEAR
POC BILIRUBIN, URINE: NEGATIVE
POC BLOOD, URINE: NEGATIVE
POC COLOR, URINE: YELLOW
POC GLUCOSE, URINE: NEGATIVE MG/DL
POC KETONES, URINE: NEGATIVE MG/DL
POC LEUKOCYTES, URINE: NEGATIVE
POC NITRITE,URINE: NEGATIVE
POC PH, URINE: 6 PH
POC PROTEIN, URINE: NEGATIVE MG/DL
POC SPECIFIC GRAVITY, URINE: 1.01
POC UROBILINOGEN, URINE: 0.2 EU/DL

## 2023-12-27 PROCEDURE — 52224 CYSTOSCOPY AND TREATMENT: CPT | Performed by: STUDENT IN AN ORGANIZED HEALTH CARE EDUCATION/TRAINING PROGRAM

## 2023-12-27 PROCEDURE — 88305 TISSUE EXAM BY PATHOLOGIST: CPT | Performed by: STUDENT IN AN ORGANIZED HEALTH CARE EDUCATION/TRAINING PROGRAM

## 2023-12-27 PROCEDURE — 88305 TISSUE EXAM BY PATHOLOGIST: CPT

## 2023-12-27 PROCEDURE — 81003 URINALYSIS AUTO W/O SCOPE: CPT | Performed by: STUDENT IN AN ORGANIZED HEALTH CARE EDUCATION/TRAINING PROGRAM

## 2023-12-27 RX ORDER — ESCITALOPRAM OXALATE 10 MG/1
10 TABLET ORAL DAILY
Qty: 90 TABLET | Refills: 1 | Status: SHIPPED | OUTPATIENT
Start: 2023-12-27 | End: 2024-03-15 | Stop reason: SDUPTHER

## 2023-12-27 NOTE — PROGRESS NOTES
HPI:  68 year old female previously seen by Dr. Frias for TaLG bladder cancer, diagnosed 8/22. On active surveillance every 3mos. Previous cytology (12/8/22) was CESILIA. Cre: 0.74 (12/19/23). Presents for cystoscopy. Denies hematuria. Good urinary function.     Hx: COPD  SHx: appendectomy, back surgery, hand surgery, hip replacement, knee replacement      Cre: 0.74 (12/19/23), 0.91 (11/30/22)    Review of Systems:  All systems reviewed. Anything negative noted in the HPI.    Physical Exam:  Vitals signs reviewed.  Constitutional:      Appearance: Well-developed.  HENT:     Head: Normocephalic and atraumatic.  Neck:     Musculoskeletal: Normal range of motion.  Pulmonary:     Effort: Pulmonary effort is normal.  Musculoskeletal: Normal range of motion.  Skin:     General: Skin is warm and dry.  Neurological:     Mental Status: Alert and oriented to person, place, and time.  Psychiatric:        Behavior: Behavior normal.        Thought Content: Thought content normal.        Judgment: Judgment normal.    Cystoscopy    Date/Time: 12/27/2023 4:18 PM    Performed by: Al Veliz MD  Authorized by: Al Veliz MD    Procedure - Bladder Cystoscopy:     Procedure details: cystoscopy, biopsy and fulguration    Post-procedure:     Patient tolerance: Patient tolerated the procedure well with no immediate complications      Comments:      Two medium size papillary tumors at the dome, TURBT      Assessment/Plan   68 year old female previously seen by Dr. Frias for TaLG bladder cancer, diagnosed 8/22. On active surveillance every 3mos. Previous cytology (12/8/22) was CESILIA. Cre: 0.74 (12/19/23). Denies hematuria. Good urinary function. Management options including risks, benefits and alternatives discussed at length and all questions answered. Patient prefers to proceed with cystoscopy w/ fulguration in 3mos.     Send for cytology         By signing my name below, I, Naun Jeffery, attest that this  documentation has been prepared under the direction and in the presence of Dr. Al Veliz.  All medical record entries made by the Scribe were at my direction and personally dictated by me. I have reviewed the chart and agree that the record accurately reflects my personal performance of the history, physical exam, discussion and plan.

## 2023-12-28 RX ORDER — NITROFURANTOIN 25; 75 MG/1; MG/1
100 CAPSULE ORAL EVERY 12 HOURS
Qty: 4 CAPSULE | Refills: 0 | Status: SHIPPED | OUTPATIENT
Start: 2023-12-28 | End: 2023-12-30

## 2024-01-02 ENCOUNTER — OFFICE VISIT (OUTPATIENT)
Dept: PRIMARY CARE | Facility: CLINIC | Age: 69
End: 2024-01-02
Payer: MEDICARE

## 2024-01-02 VITALS
HEART RATE: 57 BPM | BODY MASS INDEX: 21.24 KG/M2 | SYSTOLIC BLOOD PRESSURE: 116 MMHG | OXYGEN SATURATION: 96 % | WEIGHT: 131.6 LBS | DIASTOLIC BLOOD PRESSURE: 70 MMHG

## 2024-01-02 DIAGNOSIS — Z79.899 HIGH RISK MEDICATION USE: ICD-10-CM

## 2024-01-02 DIAGNOSIS — M54.6 CHRONIC BILATERAL THORACIC BACK PAIN: Primary | ICD-10-CM

## 2024-01-02 DIAGNOSIS — G89.29 CHRONIC BILATERAL THORACIC BACK PAIN: Primary | ICD-10-CM

## 2024-01-02 DIAGNOSIS — E46 PROTEIN-CALORIE MALNUTRITION, UNSPECIFIED SEVERITY (MULTI): ICD-10-CM

## 2024-01-02 DIAGNOSIS — J43.9 PULMONARY EMPHYSEMA, UNSPECIFIED EMPHYSEMA TYPE (MULTI): ICD-10-CM

## 2024-01-02 DIAGNOSIS — K51.90 ULCERATIVE COLITIS WITHOUT COMPLICATIONS, UNSPECIFIED LOCATION (MULTI): ICD-10-CM

## 2024-01-02 DIAGNOSIS — Z23 FLU VACCINE NEED: ICD-10-CM

## 2024-01-02 DIAGNOSIS — F33.41 RECURRENT MAJOR DEPRESSIVE DISORDER, IN PARTIAL REMISSION (CMS-HCC): ICD-10-CM

## 2024-01-02 DIAGNOSIS — C67.2 MALIGNANT NEOPLASM OF LATERAL WALL OF URINARY BLADDER (MULTI): ICD-10-CM

## 2024-01-02 DIAGNOSIS — Z23 NEED FOR PNEUMOCOCCAL 20-VALENT CONJUGATE VACCINATION: ICD-10-CM

## 2024-01-02 DIAGNOSIS — N18.31 STAGE 3A CHRONIC KIDNEY DISEASE (MULTI): ICD-10-CM

## 2024-01-02 DIAGNOSIS — I11.0 HYPERTENSIVE HEART DISEASE WITH CONGESTIVE HEART FAILURE, UNSPECIFIED HEART FAILURE TYPE (MULTI): ICD-10-CM

## 2024-01-02 PROCEDURE — 1159F MED LIST DOCD IN RCRD: CPT | Performed by: FAMILY MEDICINE

## 2024-01-02 PROCEDURE — 90662 IIV NO PRSV INCREASED AG IM: CPT | Performed by: FAMILY MEDICINE

## 2024-01-02 PROCEDURE — 1160F RVW MEDS BY RX/DR IN RCRD: CPT | Performed by: FAMILY MEDICINE

## 2024-01-02 PROCEDURE — G0008 ADMIN INFLUENZA VIRUS VAC: HCPCS | Performed by: FAMILY MEDICINE

## 2024-01-02 PROCEDURE — 90677 PCV20 VACCINE IM: CPT | Performed by: FAMILY MEDICINE

## 2024-01-02 PROCEDURE — 1125F AMNT PAIN NOTED PAIN PRSNT: CPT | Performed by: FAMILY MEDICINE

## 2024-01-02 PROCEDURE — 99214 OFFICE O/P EST MOD 30 MIN: CPT | Performed by: FAMILY MEDICINE

## 2024-01-02 PROCEDURE — 1111F DSCHRG MED/CURRENT MED MERGE: CPT | Performed by: FAMILY MEDICINE

## 2024-01-02 PROCEDURE — G0009 ADMIN PNEUMOCOCCAL VACCINE: HCPCS | Performed by: FAMILY MEDICINE

## 2024-01-02 RX ORDER — NALOXONE HYDROCHLORIDE 4 MG/.1ML
4 SPRAY NASAL AS NEEDED
Qty: 1 EACH | Refills: 0 | Status: SHIPPED | OUTPATIENT
Start: 2024-01-02

## 2024-01-02 RX ORDER — TRAMADOL HYDROCHLORIDE 50 MG/1
50 TABLET ORAL EVERY 6 HOURS PRN
Qty: 28 TABLET | Refills: 0 | Status: SHIPPED | OUTPATIENT
Start: 2024-01-02 | End: 2024-01-09

## 2024-01-02 NOTE — ASSESSMENT & PLAN NOTE
Stable - continue with prn inhalers.  Follow up at least annually, more often with any worsening of symptoms.

## 2024-01-02 NOTE — PROGRESS NOTES
Subjective   Patient ID: Julissa New is a 68 y.o. female who presents for hopsital follow up.    HPI    FOLLOW UP CELLULITIS:   Admitted 12/17/2023 with cellulitis after dropping a jewelry box on her shin.  Started on IV Clindamycin with some improvement but repeat CT scan was done because of concerns with possible abscess and possible knee involvement  She is not having any knee pain currently    Wound care appointment in [place] 1/4/2024  Continue with wound care at home using atb ointment and keeping area covered.    REVIEWED RECENT HOSPITAL RECORDS.    DEPRESSION: moods level on Bupropion and Escitalopram    LOW BACK PAIN: review of old results with compression fractures L4-L5  Pain average 6/10  Worst pain 8/10    Now pain in midthoracic region  No recent fall - no thoracic area x-rays performed recently.      Review of Systems    Review of Systems negative except as noted in HPI and Chief complaint.     Objective                 /70 (BP Location: Left arm, Patient Position: Sitting, BP Cuff Size: Adult)   Pulse 57   Wt 59.7 kg (131 lb 9.6 oz)   SpO2 96%   BMI 21.24 kg/m²      Physical Exam  Constitutional:       Comments: Thin appearing   Cardiovascular:      Rate and Rhythm: Normal rate and regular rhythm.   Pulmonary:      Effort: Pulmonary effort is normal.      Breath sounds: Normal breath sounds.   Musculoskeletal:      Comments: Mid thoracic paraspinal muscle tenderness to palpation    RIGHT KNEE: no tenderness, no redness or warmth to palpation   Skin:     Comments: Right outer lower leg with circular open wound with thick eschar noted on surface, no active bleeding - redness surrounding, minimal swelling noted   Neurological:      Mental Status: She is alert.         Assessment/Plan   Problem List Items Addressed This Visit       COPD (chronic obstructive pulmonary disease) with emphysema (CMS/HCC)     Stable - continue with prn inhalers.  Follow up at least annually, more often with  any worsening of symptoms.         Hypertensive CHF (congestive heart failure) (CMS/HCC)     BP controlled today.  Continue medications at current dosage - will continue to monitor.         Major depression, recurrent (CMS/HCC)     Encouraging follow up with Behavioral Health Collaborative Care team.  Continue with Bupropion and Escitalopram at current dosages.  Follow up 6 months.         Malignant neoplasm of lateral wall of urinary bladder (CMS/HCC)     Followed by Urology - pathology pending.  Follow up with urology as scheduled.         Stage 3 chronic kidney disease (CMS/HCC)     Reviewed recent labs - CKD stable.  Avoid NSAIDs as much as possible, dietary changes discussed.         Ulcerative colitis (CMS/HCC)     Stable currently with diet controlled symptoms.  Call if any new or worsening symptoms.         Protein-calorie malnutrition, unspecified severity (CMS/HCC)     Weight loss continues - encouraging increased lean protein sources and well rounded diet.          Other Visit Diagnoses       Chronic bilateral thoracic back pain    -  Primary    Relevant Medications    traMADol (Ultram) 50 mg tablet    Other Relevant Orders    XR thoracic spine 2 views    High risk medication use        Relevant Medications    naloxone (Narcan) 4 mg/0.1 mL nasal spray    Flu vaccine need        Relevant Orders    Flu vaccine, quadrivalent, high-dose, preservative free, age 65y+ (FLUZONE) (Completed)    Need for pneumococcal 20-valent conjugate vaccination        Relevant Orders    Pneumococcal conjugate vaccine, 20-valent (PREVNAR 20) (Completed)          Starting acute dosage of Tramadol until xray results available.  [place]ARRS report has been run and reviewed - no suspicious or worrisome activity noted.  I have considered the risk of abuse, dependance, addiction, and diversion.    I believe it is clinically appropriate for this patient to continue taking this medication.      FOLLOW UP 3 MONTHS, SOONER IF BACK PAIN  WORSEN.    Patient was identified as a fall risk. Risk prevention instructions provided.

## 2024-01-02 NOTE — ASSESSMENT & PLAN NOTE
Encouraging follow up with Behavioral Health Collaborative Care team.  Continue with Bupropion and Escitalopram at current dosages.  Follow up 6 months.

## 2024-01-04 ENCOUNTER — OFFICE VISIT (OUTPATIENT)
Dept: WOUND CARE | Facility: CLINIC | Age: 69
End: 2024-01-04
Payer: MEDICARE

## 2024-01-04 PROCEDURE — 11042 DBRDMT SUBQ TIS 1ST 20SQCM/<: CPT

## 2024-01-04 PROCEDURE — 99213 OFFICE O/P EST LOW 20 MIN: CPT | Mod: 25

## 2024-01-05 LAB
LABORATORY COMMENT REPORT: NORMAL
PATH REPORT.FINAL DX SPEC: NORMAL
PATH REPORT.GROSS SPEC: NORMAL
PATH REPORT.RELEVANT HX SPEC: NORMAL
PATH REPORT.TOTAL CANCER: NORMAL

## 2024-01-10 ENCOUNTER — ANCILLARY PROCEDURE (OUTPATIENT)
Dept: RADIOLOGY | Facility: CLINIC | Age: 69
End: 2024-01-10
Payer: MEDICARE

## 2024-01-10 ENCOUNTER — OFFICE VISIT (OUTPATIENT)
Dept: OPHTHALMOLOGY | Facility: CLINIC | Age: 69
End: 2024-01-10
Payer: MEDICARE

## 2024-01-10 DIAGNOSIS — H52.03 HYPEROPIA, BILATERAL: ICD-10-CM

## 2024-01-10 DIAGNOSIS — H35.3132 INTERMEDIATE STAGE NONEXUDATIVE AGE-RELATED MACULAR DEGENERATION OF BOTH EYES: ICD-10-CM

## 2024-01-10 DIAGNOSIS — Z96.1 PSEUDOPHAKIA: ICD-10-CM

## 2024-01-10 DIAGNOSIS — M54.6 CHRONIC BILATERAL THORACIC BACK PAIN: ICD-10-CM

## 2024-01-10 DIAGNOSIS — H43.813 PVD (POSTERIOR VITREOUS DETACHMENT), BOTH EYES: Primary | ICD-10-CM

## 2024-01-10 DIAGNOSIS — H52.4 PRESBYOPIA: ICD-10-CM

## 2024-01-10 DIAGNOSIS — G89.29 CHRONIC BILATERAL THORACIC BACK PAIN: ICD-10-CM

## 2024-01-10 DIAGNOSIS — H26.491 PCO (POSTERIOR CAPSULAR OPACIFICATION), RIGHT: ICD-10-CM

## 2024-01-10 DIAGNOSIS — H25.812 COMBINED FORM OF AGE-RELATED CATARACT, LEFT EYE: ICD-10-CM

## 2024-01-10 PROBLEM — H35.3131 EARLY DRY STAGE NONEXUDATIVE AGE-RELATED MACULAR DEGENERATION OF BOTH EYES: Status: ACTIVE | Noted: 2024-01-10

## 2024-01-10 PROCEDURE — 72072 X-RAY EXAM THORAC SPINE 3VWS: CPT

## 2024-01-10 PROCEDURE — 72072 X-RAY EXAM THORAC SPINE 3VWS: CPT | Performed by: RADIOLOGY

## 2024-01-10 PROCEDURE — 92134 CPTRZ OPH DX IMG PST SGM RTA: CPT | Mod: BILATERAL PROCEDURE | Performed by: OPHTHALMOLOGY

## 2024-01-10 PROCEDURE — 92014 COMPRE OPH EXAM EST PT 1/>: CPT | Performed by: OPHTHALMOLOGY

## 2024-01-10 ASSESSMENT — EXTERNAL EXAM - LEFT EYE: OS_EXAM: NORMAL

## 2024-01-10 ASSESSMENT — ENCOUNTER SYMPTOMS
CARDIOVASCULAR NEGATIVE: 0
HEMATOLOGIC/LYMPHATIC NEGATIVE: 0
ENDOCRINE NEGATIVE: 0
CONSTITUTIONAL NEGATIVE: 0
GASTROINTESTINAL NEGATIVE: 0
PSYCHIATRIC NEGATIVE: 0
ALLERGIC/IMMUNOLOGIC NEGATIVE: 0
MUSCULOSKELETAL NEGATIVE: 0
NEUROLOGICAL NEGATIVE: 0
EYES NEGATIVE: 1
RESPIRATORY NEGATIVE: 0

## 2024-01-10 ASSESSMENT — SLIT LAMP EXAM - LIDS
COMMENTS: GOOD POSITION
COMMENTS: GOOD POSITION

## 2024-01-10 ASSESSMENT — REFRACTION_WEARINGRX
OD_CYLINDER: SPHERE
OS_SPHERE: +0.75
OS_ADD: +2.25
OS_CYLINDER: SPHERE
OD_ADD: +2.25
OD_SPHERE: +0.50

## 2024-01-10 ASSESSMENT — VISUAL ACUITY
METHOD: SNELLEN - LINEAR
OS_BAT_MED: 20/20
OD_CC: 20/20-2
OS_CC: 20/20
CORRECTION_TYPE: GLASSES
OD_BAT_MED: 20/20

## 2024-01-10 ASSESSMENT — CUP TO DISC RATIO
OS_RATIO: .25
OD_RATIO: .3

## 2024-01-10 ASSESSMENT — EXTERNAL EXAM - RIGHT EYE: OD_EXAM: NORMAL

## 2024-01-10 ASSESSMENT — REFRACTION_MANIFEST
OS_SPHERE: +0.50
OD_SPHERE: +0.75
OD_ADD: +2.50
OS_ADD: +2.50
OS_CYLINDER: SPHERE
OD_CYLINDER: SPHERE

## 2024-01-10 ASSESSMENT — TONOMETRY
OS_IOP_MMHG: 13
OD_IOP_MMHG: 13
IOP_METHOD: GOLDMANN APPLANATION

## 2024-01-10 NOTE — PATIENT INSTRUCTIONS
Please continue taking AREDS 2 formulation vitamins for macular degeneration (Preservision, Ocuvite, etc)

## 2024-01-10 NOTE — PROGRESS NOTES
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 UV sunglasses when outside. Discussed smoking cessation (patient has been smoking since age 10) - patient has cut down significantly over time. Would recommend continuing AREDS2.   -OCT macula (1/10/24) - SS: 10/10 OD and 8/10 OS. Normal thickness and contour OU.  Medium/large drusen OD, small/medium drusen OS. 267/264. Possible increase in size and number of drusen compared to 12/21/22.   -Continue seeing Dr. Martino annually alternating with annual visits with me. F/u with Dr. Martino in 6 months and return to see me in 1 year for comprehensive exam with OCT macula.    PVD (posterior vitreous detachment), right eyeH43.811  PVD (posterior vitreous detachment), left eyeH43.812  -History of floater and flashes OD 9/2022  -No retinal tear or detachment seen on exam. Monitor with routine dilated exams.     Combined form of age-related cataract, left eyeH25.812  -Not visually significant at this time. Monitor.     PCO (posterior capsular opacification), wprnvM27.491  Pseudophakia of right eyeZ96.1 - 2/25/21  -Doing well. Good vision. IOP good. Monitor.   -Mild PCO OD, not visually significant at this time. Monitor and can consider YAG capsulotomy in the future if condition worsens.   -Was on POLYTRIM due to allergy to levaquin.     LajpgxjzyE97.00  QkiyvnbvrzB60.4  -Continue preoperative glasses - similar Rx as needed now postoperatively. Declines new Rx at this time.

## 2024-01-11 ENCOUNTER — OFFICE VISIT (OUTPATIENT)
Dept: WOUND CARE | Facility: CLINIC | Age: 69
End: 2024-01-11
Payer: MEDICARE

## 2024-01-11 PROCEDURE — 11042 DBRDMT SUBQ TIS 1ST 20SQCM/<: CPT

## 2024-01-12 ENCOUNTER — TELEPHONE (OUTPATIENT)
Dept: PRIMARY CARE | Facility: CLINIC | Age: 69
End: 2024-01-12
Payer: MEDICARE

## 2024-01-17 ENCOUNTER — ANCILLARY PROCEDURE (OUTPATIENT)
Dept: RADIOLOGY | Facility: CLINIC | Age: 69
End: 2024-01-17
Payer: MEDICARE

## 2024-01-17 DIAGNOSIS — Z78.0 ASYMPTOMATIC MENOPAUSAL STATE: ICD-10-CM

## 2024-01-17 PROCEDURE — 77080 DXA BONE DENSITY AXIAL: CPT | Performed by: RADIOLOGY

## 2024-01-17 PROCEDURE — 77080 DXA BONE DENSITY AXIAL: CPT

## 2024-01-18 ENCOUNTER — OFFICE VISIT (OUTPATIENT)
Dept: WOUND CARE | Facility: CLINIC | Age: 69
End: 2024-01-18
Payer: MEDICARE

## 2024-01-18 PROCEDURE — 11042 DBRDMT SUBQ TIS 1ST 20SQCM/<: CPT

## 2024-01-19 DIAGNOSIS — M80.00XA AGE-RELATED OSTEOPOROSIS WITH CURRENT PATHOLOGICAL FRACTURE, INITIAL ENCOUNTER: Primary | ICD-10-CM

## 2024-01-19 RX ORDER — IBANDRONATE SODIUM 150 MG/1
150 TABLET, FILM COATED ORAL
Qty: 1 TABLET | Refills: 11 | Status: SHIPPED | OUTPATIENT
Start: 2024-01-19 | End: 2025-01-18

## 2024-01-25 ENCOUNTER — OFFICE VISIT (OUTPATIENT)
Dept: WOUND CARE | Facility: CLINIC | Age: 69
End: 2024-01-25
Payer: MEDICARE

## 2024-01-25 PROCEDURE — 11042 DBRDMT SUBQ TIS 1ST 20SQCM/<: CPT

## 2024-02-01 ENCOUNTER — OFFICE VISIT (OUTPATIENT)
Dept: WOUND CARE | Facility: CLINIC | Age: 69
End: 2024-02-01
Payer: MEDICARE

## 2024-02-01 PROCEDURE — 11042 DBRDMT SUBQ TIS 1ST 20SQCM/<: CPT

## 2024-02-08 ENCOUNTER — APPOINTMENT (OUTPATIENT)
Dept: WOUND CARE | Facility: CLINIC | Age: 69
End: 2024-02-08
Payer: MEDICARE

## 2024-03-15 ENCOUNTER — OFFICE VISIT (OUTPATIENT)
Dept: PRIMARY CARE | Facility: CLINIC | Age: 69
End: 2024-03-15
Payer: MEDICARE

## 2024-03-15 VITALS
OXYGEN SATURATION: 96 % | BODY MASS INDEX: 21.89 KG/M2 | SYSTOLIC BLOOD PRESSURE: 120 MMHG | WEIGHT: 135.6 LBS | HEART RATE: 84 BPM | DIASTOLIC BLOOD PRESSURE: 80 MMHG

## 2024-03-15 DIAGNOSIS — E03.9 ADULT HYPOTHYROIDISM: ICD-10-CM

## 2024-03-15 DIAGNOSIS — J43.9 PULMONARY EMPHYSEMA, UNSPECIFIED EMPHYSEMA TYPE (MULTI): Primary | ICD-10-CM

## 2024-03-15 DIAGNOSIS — I11.0 HYPERTENSIVE HEART DISEASE WITH CONGESTIVE HEART FAILURE, UNSPECIFIED HEART FAILURE TYPE (MULTI): ICD-10-CM

## 2024-03-15 DIAGNOSIS — F41.9 ANXIETY DISORDER, UNSPECIFIED TYPE: ICD-10-CM

## 2024-03-15 DIAGNOSIS — F33.2 SEVERE EPISODE OF RECURRENT MAJOR DEPRESSIVE DISORDER, WITHOUT PSYCHOTIC FEATURES (MULTI): ICD-10-CM

## 2024-03-15 DIAGNOSIS — E78.2 MIXED HYPERLIPIDEMIA: ICD-10-CM

## 2024-03-15 DIAGNOSIS — F33.1 MODERATE EPISODE OF RECURRENT MAJOR DEPRESSIVE DISORDER (MULTI): ICD-10-CM

## 2024-03-15 PROCEDURE — 99214 OFFICE O/P EST MOD 30 MIN: CPT | Performed by: FAMILY MEDICINE

## 2024-03-15 PROCEDURE — 1160F RVW MEDS BY RX/DR IN RCRD: CPT | Performed by: FAMILY MEDICINE

## 2024-03-15 PROCEDURE — 1159F MED LIST DOCD IN RCRD: CPT | Performed by: FAMILY MEDICINE

## 2024-03-15 PROCEDURE — 4004F PT TOBACCO SCREEN RCVD TLK: CPT | Performed by: FAMILY MEDICINE

## 2024-03-15 RX ORDER — CLOPIDOGREL BISULFATE 75 MG/1
75 TABLET ORAL DAILY
Qty: 90 TABLET | Refills: 1 | Status: SHIPPED | OUTPATIENT
Start: 2024-03-15

## 2024-03-15 RX ORDER — ESCITALOPRAM OXALATE 10 MG/1
10 TABLET ORAL DAILY
Qty: 90 TABLET | Refills: 1 | Status: SHIPPED | OUTPATIENT
Start: 2024-03-15 | End: 2024-09-11

## 2024-03-15 RX ORDER — LEVOTHYROXINE SODIUM 25 UG/1
25 TABLET ORAL DAILY
Qty: 90 TABLET | Refills: 1 | Status: SHIPPED | OUTPATIENT
Start: 2024-03-15 | End: 2024-04-12

## 2024-03-15 NOTE — PATIENT INSTRUCTIONS

## 2024-03-15 NOTE — PROGRESS NOTES
"Subjective   Patient ID: Julissa New \"Lisa\" is a 68 y.o. female who presents for Follow-up (6 month follow up.).    HPI    MEMORY ISSUES: feeling like she is more forgetful, small things  Nothing concerning with driving or duties at home.    SLEEP DISTURBANCE: falling asleep around 3 am and gets up around 11 am.  Fatigued, weight stable    HYPERLIPIDEMIA: Patient is tolerating regular statin dosing without muscle achiness or weakness.  Review of last fasting lipids with good control.  Lab Results   Component Value Date    CHOL 147 09/30/2022     Lab Results   Component Value Date    HDL 39.0 (A) 09/30/2022     No results found for: \"LDLCALC\"  Lab Results   Component Value Date    TRIG 88 09/30/2022     No components found for: \"CHOLHDL\"      CHF WITH HTN: no chest pain or SOB  No edema or wheezing    VERTIGO:  Did have a short burst of vertigo with head motion.  No falls with dizziness.  Not using Meclizine regularly.    DEPRESSION:   Increased stressors caring for her sister who is in poor health   home constantly and not helping with daily activities    COLITIS: no recent flares.  Stools looser but not diarrhea.    Review of Systems    Review of Systems negative except as noted in HPI and Chief complaint.     Objective     /80 (BP Location: Left arm, Patient Position: Sitting, BP Cuff Size: Adult)   Pulse 84   Wt 61.5 kg (135 lb 9.6 oz)   SpO2 96%   BMI 21.89 kg/m²      Physical Exam  Constitutional:       General: She is not in acute distress.     Comments: Thin appearing   HENT:      Head: Normocephalic and atraumatic.   Neck:      Vascular: No carotid bruit.   Cardiovascular:      Rate and Rhythm: Normal rate and regular rhythm.      Pulses: Normal pulses.      Heart sounds: Normal heart sounds. No murmur heard.     No gallop.   Pulmonary:      Effort: Pulmonary effort is normal.      Breath sounds: Normal breath sounds. No wheezing, rhonchi or rales.   Musculoskeletal:      Cervical " back: Normal range of motion and neck supple. No rigidity or tenderness.   Lymphadenopathy:      Cervical: No cervical adenopathy.   Skin:     General: Skin is warm and dry.   Neurological:      Mental Status: She is alert.   Psychiatric:         Mood and Affect: Mood normal.         Behavior: Behavior normal.         Assessment/Plan   Problem List Items Addressed This Visit       Adult hypothyroidism     Stable on current Levothyroxine dosage.  Continue at current dosage.         Relevant Medications    levothyroxine (LevoxyL) 25 mcg tablet    Anxiety disorder    Relevant Medications    escitalopram (Lexapro) 10 mg tablet    COPD (chronic obstructive pulmonary disease) with emphysema (CMS/HCC) - Primary    Mixed hyperlipidemia     Stable - continue with medications at current dosage.         Hypertensive CHF (congestive heart failure) (CMS/HCC)     BP controlled today.  Continue medications at current dosage - will continue to monitor.         Relevant Medications    clopidogrel (Plavix) 75 mg tablet    Major depression, recurrent (CMS/HCC)     Encouraging follow up with Behavioral Health Collaborative Care team.  Continue with Bupropion and Escitalopram at current dosages.  Follow up 6 months.         Relevant Medications    escitalopram (Lexapro) 10 mg tablet        Patient was identified as a fall risk. Risk prevention instructions provided.

## 2024-04-04 ENCOUNTER — PROCEDURE VISIT (OUTPATIENT)
Dept: UROLOGY | Facility: CLINIC | Age: 69
End: 2024-04-04
Payer: MEDICARE

## 2024-04-04 VITALS
BODY MASS INDEX: 21.89 KG/M2 | DIASTOLIC BLOOD PRESSURE: 79 MMHG | HEART RATE: 63 BPM | SYSTOLIC BLOOD PRESSURE: 125 MMHG | HEIGHT: 66 IN

## 2024-04-04 DIAGNOSIS — C67.2 MALIGNANT NEOPLASM OF LATERAL WALL OF URINARY BLADDER (MULTI): Primary | ICD-10-CM

## 2024-04-04 LAB
POC APPEARANCE, URINE: CLEAR
POC BILIRUBIN, URINE: NEGATIVE
POC BLOOD, URINE: NEGATIVE
POC COLOR, URINE: YELLOW
POC GLUCOSE, URINE: NEGATIVE MG/DL
POC KETONES, URINE: NEGATIVE MG/DL
POC LEUKOCYTES, URINE: ABNORMAL
POC NITRITE,URINE: NEGATIVE
POC PH, URINE: 6 PH
POC PROTEIN, URINE: NEGATIVE MG/DL
POC SPECIFIC GRAVITY, URINE: <=1.005
POC UROBILINOGEN, URINE: 0.2 EU/DL

## 2024-04-04 PROCEDURE — 88112 CYTOPATH CELL ENHANCE TECH: CPT | Performed by: PATHOLOGY

## 2024-04-04 PROCEDURE — 88112 CYTOPATH CELL ENHANCE TECH: CPT

## 2024-04-04 PROCEDURE — 52000 CYSTOURETHROSCOPY: CPT | Performed by: STUDENT IN AN ORGANIZED HEALTH CARE EDUCATION/TRAINING PROGRAM

## 2024-04-04 PROCEDURE — 81002 URINALYSIS NONAUTO W/O SCOPE: CPT | Performed by: STUDENT IN AN ORGANIZED HEALTH CARE EDUCATION/TRAINING PROGRAM

## 2024-04-04 NOTE — PROGRESS NOTES
HPI:  68 year old female previously seen by Dr. Frias for TaLG bladder cancer, diagnosed 8/22. On active surveillance every 3mos. Previous cytology (12/8/22) was CESILIA. Cre: 0.74 (12/19/23). Presents for cystoscopy. Doing well.      Hx: COPD  SHx: appendectomy, back surgery, hand surgery, hip replacement, knee replacement      Cre: 0.74 (12/19/23), 0.91 (11/30/22)    Review of Systems:  All systems reviewed. Anything negative noted in the HPI.    Physical Exam:  Vitals signs reviewed.  Constitutional:      Appearance: Well-developed.  HENT:     Head: Normocephalic and atraumatic.  Neck:     Musculoskeletal: Normal range of motion.  Pulmonary:     Effort: Pulmonary effort is normal.  Musculoskeletal: Normal range of motion.  Skin:     General: Skin is warm and dry.  Neurological:     Mental Status: Alert and oriented to person, place, and time.  Psychiatric:        Behavior: Behavior normal.        Thought Content: Thought content normal.        Judgment: Judgment normal.    Cystoscopy    Date/Time: 4/4/2024 4:20 PM    Performed by: Al Veliz MD  Authorized by: Al Veliz MD      Post-procedure:     Patient tolerance: Patient tolerated the procedure well with no immediate complications      Comments:      Some inflammation at bladder base, no mucosal lesions       Assessment/Plan   68 year old female previously seen by Dr. Frias for TaLG bladder cancer, diagnosed 8/22. On active surveillance every 3mos. Previous cytology (12/8/22) was CESILIA. Cre: 0.74 (12/19/23). Doing well. Management options including risks, benefits and alternatives discussed at length and all questions answered. Patient prefers to proceed with follow-up in 3mos for cystoscopy with possible fulguration.         Scribe Attestation  By signing my name below, IAnnita Scribe   attest that this documentation has been prepared under the direction and in the presence of Al Veliz MD.

## 2024-04-08 DIAGNOSIS — E03.9 ADULT HYPOTHYROIDISM: ICD-10-CM

## 2024-04-08 LAB
LABORATORY COMMENT REPORT: NORMAL
LABORATORY COMMENT REPORT: NORMAL
PATH REPORT.FINAL DX SPEC: NORMAL
PATH REPORT.GROSS SPEC: NORMAL
PATH REPORT.RELEVANT HX SPEC: NORMAL
PATH REPORT.TOTAL CANCER: NORMAL

## 2024-04-12 RX ORDER — LEVOTHYROXINE SODIUM 25 UG/1
25 TABLET ORAL DAILY
Qty: 90 TABLET | Refills: 0 | Status: SHIPPED | OUTPATIENT
Start: 2024-04-12 | End: 2024-04-29

## 2024-04-19 DIAGNOSIS — E03.9 ADULT HYPOTHYROIDISM: ICD-10-CM

## 2024-04-29 RX ORDER — LEVOTHYROXINE SODIUM 25 UG/1
25 TABLET ORAL DAILY
Qty: 90 TABLET | Refills: 0 | Status: SHIPPED | OUTPATIENT
Start: 2024-04-29

## 2024-06-24 ENCOUNTER — APPOINTMENT (OUTPATIENT)
Dept: OPHTHALMOLOGY | Facility: CLINIC | Age: 69
End: 2024-06-24
Payer: MEDICARE

## 2024-07-26 DIAGNOSIS — E03.9 ADULT HYPOTHYROIDISM: ICD-10-CM

## 2024-07-30 RX ORDER — LEVOTHYROXINE SODIUM 25 UG/1
25 TABLET ORAL DAILY
Qty: 90 TABLET | Refills: 0 | Status: SHIPPED | OUTPATIENT
Start: 2024-07-30

## 2024-08-12 DIAGNOSIS — E78.2 MIXED HYPERLIPIDEMIA: ICD-10-CM

## 2024-08-15 RX ORDER — ATORVASTATIN CALCIUM 40 MG/1
40 TABLET, FILM COATED ORAL DAILY
Qty: 90 TABLET | Refills: 0 | Status: SHIPPED | OUTPATIENT
Start: 2024-08-15

## 2024-08-19 ENCOUNTER — LAB (OUTPATIENT)
Dept: LAB | Facility: LAB | Age: 69
End: 2024-08-19
Payer: MEDICARE

## 2024-08-19 ENCOUNTER — TELEPHONE (OUTPATIENT)
Dept: PRIMARY CARE | Facility: CLINIC | Age: 69
End: 2024-08-19

## 2024-08-19 ENCOUNTER — APPOINTMENT (OUTPATIENT)
Dept: PRIMARY CARE | Facility: CLINIC | Age: 69
End: 2024-08-19
Payer: MEDICARE

## 2024-08-19 VITALS
OXYGEN SATURATION: 97 % | WEIGHT: 134 LBS | SYSTOLIC BLOOD PRESSURE: 120 MMHG | BODY MASS INDEX: 21.63 KG/M2 | HEART RATE: 75 BPM | DIASTOLIC BLOOD PRESSURE: 70 MMHG

## 2024-08-19 DIAGNOSIS — D52.0 DIETARY FOLATE DEFICIENCY ANEMIA: ICD-10-CM

## 2024-08-19 DIAGNOSIS — D64.9 ANEMIA, UNSPECIFIED TYPE: ICD-10-CM

## 2024-08-19 DIAGNOSIS — Z00.00 ANNUAL PHYSICAL EXAM: ICD-10-CM

## 2024-08-19 DIAGNOSIS — E55.9 VITAMIN D DEFICIENCY: ICD-10-CM

## 2024-08-19 DIAGNOSIS — I11.0 HYPERTENSIVE HEART DISEASE WITH CONGESTIVE HEART FAILURE, UNSPECIFIED HEART FAILURE TYPE (MULTI): ICD-10-CM

## 2024-08-19 DIAGNOSIS — E03.9 ADULT HYPOTHYROIDISM: ICD-10-CM

## 2024-08-19 DIAGNOSIS — E78.2 MIXED HYPERLIPIDEMIA: ICD-10-CM

## 2024-08-19 DIAGNOSIS — M80.00XD AGE-RELATED OSTEOPOROSIS WITH CURRENT PATHOLOGICAL FRACTURE WITH ROUTINE HEALING, SUBSEQUENT ENCOUNTER: ICD-10-CM

## 2024-08-19 DIAGNOSIS — M17.0 OSTEOARTHRITIS OF BOTH KNEES, UNSPECIFIED OSTEOARTHRITIS TYPE: ICD-10-CM

## 2024-08-19 DIAGNOSIS — L03.90 CELLULITIS: ICD-10-CM

## 2024-08-19 DIAGNOSIS — S22.060D COMPRESSION FRACTURE OF T8 VERTEBRA WITH ROUTINE HEALING, SUBSEQUENT ENCOUNTER: Primary | ICD-10-CM

## 2024-08-19 LAB
25(OH)D3 SERPL-MCNC: 61 NG/ML (ref 30–100)
ALBUMIN SERPL BCP-MCNC: 4 G/DL (ref 3.4–5)
ALP SERPL-CCNC: 55 U/L (ref 33–136)
ALT SERPL W P-5'-P-CCNC: 10 U/L (ref 7–45)
ANION GAP SERPL CALC-SCNC: 13 MMOL/L (ref 10–20)
AST SERPL W P-5'-P-CCNC: 29 U/L (ref 9–39)
BILIRUB SERPL-MCNC: 0.4 MG/DL (ref 0–1.2)
BUN SERPL-MCNC: 15 MG/DL (ref 6–23)
CALCIUM SERPL-MCNC: 9.1 MG/DL (ref 8.6–10.6)
CHLORIDE SERPL-SCNC: 103 MMOL/L (ref 98–107)
CHOLEST SERPL-MCNC: 175 MG/DL (ref 0–199)
CHOLESTEROL/HDL RATIO: 3.8
CO2 SERPL-SCNC: 29 MMOL/L (ref 21–32)
CREAT SERPL-MCNC: 0.85 MG/DL (ref 0.5–1.05)
EGFRCR SERPLBLD CKD-EPI 2021: 75 ML/MIN/1.73M*2
ERYTHROCYTE [DISTWIDTH] IN BLOOD BY AUTOMATED COUNT: 15.5 % (ref 11.5–14.5)
GLUCOSE SERPL-MCNC: 76 MG/DL (ref 74–99)
HCT VFR BLD AUTO: 42.5 % (ref 36–46)
HDLC SERPL-MCNC: 46.1 MG/DL
HGB BLD-MCNC: 13.5 G/DL (ref 12–16)
LDLC SERPL CALC-MCNC: 110 MG/DL
MCH RBC QN AUTO: 31.2 PG (ref 26–34)
MCHC RBC AUTO-ENTMCNC: 31.8 G/DL (ref 32–36)
MCV RBC AUTO: 98 FL (ref 80–100)
NON HDL CHOLESTEROL: 129 MG/DL (ref 0–149)
NRBC BLD-RTO: 0 /100 WBCS (ref 0–0)
PLATELET # BLD AUTO: 288 X10*3/UL (ref 150–450)
POTASSIUM SERPL-SCNC: 4.3 MMOL/L (ref 3.5–5.3)
PROT SERPL-MCNC: 7.1 G/DL (ref 6.4–8.2)
RBC # BLD AUTO: 4.33 X10*6/UL (ref 4–5.2)
SODIUM SERPL-SCNC: 141 MMOL/L (ref 136–145)
TRIGL SERPL-MCNC: 94 MG/DL (ref 0–149)
TSH SERPL-ACNC: 3.13 MIU/L (ref 0.44–3.98)
VLDL: 19 MG/DL (ref 0–40)
WBC # BLD AUTO: 9.3 X10*3/UL (ref 4.4–11.3)

## 2024-08-19 PROCEDURE — 84443 ASSAY THYROID STIM HORMONE: CPT

## 2024-08-19 PROCEDURE — 1160F RVW MEDS BY RX/DR IN RCRD: CPT | Performed by: FAMILY MEDICINE

## 2024-08-19 PROCEDURE — 85027 COMPLETE CBC AUTOMATED: CPT

## 2024-08-19 PROCEDURE — 80053 COMPREHEN METABOLIC PANEL: CPT

## 2024-08-19 PROCEDURE — 1159F MED LIST DOCD IN RCRD: CPT | Performed by: FAMILY MEDICINE

## 2024-08-19 PROCEDURE — 82306 VITAMIN D 25 HYDROXY: CPT

## 2024-08-19 PROCEDURE — 36415 COLL VENOUS BLD VENIPUNCTURE: CPT

## 2024-08-19 PROCEDURE — 80061 LIPID PANEL: CPT

## 2024-08-19 PROCEDURE — 1123F ACP DISCUSS/DSCN MKR DOCD: CPT | Performed by: FAMILY MEDICINE

## 2024-08-19 PROCEDURE — 99214 OFFICE O/P EST MOD 30 MIN: CPT | Performed by: FAMILY MEDICINE

## 2024-08-19 RX ORDER — TIZANIDINE 2 MG/1
2 TABLET ORAL EVERY 8 HOURS PRN
Qty: 30 TABLET | Refills: 1 | Status: SHIPPED | OUTPATIENT
Start: 2024-08-19

## 2024-08-19 RX ORDER — HYDROCODONE BITARTRATE AND ACETAMINOPHEN 5; 325 MG/1; MG/1
1 TABLET ORAL EVERY 6 HOURS PRN
Qty: 20 TABLET | Refills: 0 | Status: SHIPPED | OUTPATIENT
Start: 2024-08-19 | End: 2024-08-26

## 2024-08-19 RX ORDER — MELOXICAM 15 MG/1
15 TABLET ORAL DAILY
Qty: 30 TABLET | Refills: 5 | Status: SHIPPED | OUTPATIENT
Start: 2024-08-19 | End: 2025-08-19

## 2024-08-19 ASSESSMENT — ENCOUNTER SYMPTOMS
OCCASIONAL FEELINGS OF UNSTEADINESS: 0
LOSS OF SENSATION IN FEET: 0
DEPRESSION: 0

## 2024-08-19 NOTE — PROGRESS NOTES
"Subjective   Patient ID: Julissa New \"Lisa\" is a 68 y.o. female who presents for Back Pain (Top to middle around the rib cage, has had it since 10/2023- not getting better- she denies any injury or falls. ).    HPI    BACK PAIN:  Has had pain on and off wince 10/2023  Review of x-rays from 1/2024 with multiple compression fractures noted  Not taking anything on a regular basis    Estimates average pain rating 8/10 at the worst, usually 6-8/10    No stomach upset     Review of Systems    Review of Systems negative except as noted in HPI and Chief complaint.     Objective             VITALS:  /70 (BP Location: Left arm, Patient Position: Sitting, BP Cuff Size: Adult)   Pulse 75   Wt 60.8 kg (134 lb)   SpO2 97%   BMI 21.63 kg/m²      Physical Exam  Constitutional:       General: She is not in acute distress.     Appearance: Normal appearance. She is not ill-appearing.   HENT:      Head: Normocephalic and atraumatic.   Neck:      Vascular: No carotid bruit.   Cardiovascular:      Rate and Rhythm: Normal rate and regular rhythm.      Pulses: Normal pulses.      Heart sounds: Normal heart sounds. No murmur heard.     No gallop.   Pulmonary:      Effort: Pulmonary effort is normal.      Breath sounds: Normal breath sounds. No wheezing, rhonchi or rales.   Musculoskeletal:      Cervical back: Normal range of motion and neck supple. No rigidity or tenderness.      Comments: Right midthoracic tenderness to palaption   Lymphadenopathy:      Cervical: No cervical adenopathy.   Skin:     General: Skin is warm and dry.   Neurological:      Mental Status: She is alert.   Psychiatric:         Mood and Affect: Mood normal.         Behavior: Behavior normal.         Assessment/Plan   Problem List Items Addressed This Visit       Adult hypothyroidism    Relevant Orders    TSH with reflex to Free T4 if abnormal (Completed)    Mixed hyperlipidemia    Relevant Orders    Lipid Panel (Completed)    Hypertensive CHF " (congestive heart failure) (Multi)    Relevant Orders    Comprehensive metabolic panel (Completed)     Other Visit Diagnoses       Compression fracture of T8 vertebra with routine healing, subsequent encounter    -  Primary    Acute pain medication and muscle relaxer started.  Trial of Meloxicam - consider PT or repeat imaging if not improving.    Relevant Medications    tiZANidine (Zanaflex) 2 mg tablet    HYDROcodone-acetaminophen (Norco) 5-325 mg tablet    meloxicam (Mobic) 15 mg tablet    Age-related osteoporosis with current pathological fracture with routine healing, subsequent encounter        Vitamin D deficiency        Relevant Orders    Vitamin D 25-Hydroxy,Total (for eval of Vitamin D levels) (Completed)    Dietary folate deficiency anemia        Anemia, unspecified type        Relevant Orders    CBC    Annual physical exam        Relevant Orders    TSH with reflex to Free T4 if abnormal (Completed)        [facility] report has been run and reviewed - no suspicious or worrisome activity noted.  I have considered the risk of abuse, dependance, addiction, and diversion.    I believe it is clinically appropriate for this patient to continue taking this medication.     FOLLOW UP  for persistent or worsening sympoms , SOONER WITH ANY PROBLEMS OR CONCERNS.     Patient was identified as a fall risk. Risk prevention instructions provided.

## 2024-08-29 ENCOUNTER — APPOINTMENT (OUTPATIENT)
Dept: PRIMARY CARE | Facility: CLINIC | Age: 69
End: 2024-08-29
Payer: MEDICARE

## 2024-09-16 ENCOUNTER — APPOINTMENT (OUTPATIENT)
Dept: PRIMARY CARE | Facility: CLINIC | Age: 69
End: 2024-09-16
Payer: MEDICARE

## 2024-09-16 VITALS
HEART RATE: 67 BPM | HEIGHT: 66 IN | BODY MASS INDEX: 22.02 KG/M2 | OXYGEN SATURATION: 100 % | DIASTOLIC BLOOD PRESSURE: 84 MMHG | SYSTOLIC BLOOD PRESSURE: 124 MMHG | WEIGHT: 137 LBS

## 2024-09-16 DIAGNOSIS — F41.9 ANXIETY DISORDER, UNSPECIFIED TYPE: ICD-10-CM

## 2024-09-16 DIAGNOSIS — M80.00XA AGE-RELATED OSTEOPOROSIS WITH CURRENT PATHOLOGICAL FRACTURE, INITIAL ENCOUNTER: ICD-10-CM

## 2024-09-16 DIAGNOSIS — I11.0 HYPERTENSIVE HEART DISEASE WITH CONGESTIVE HEART FAILURE, UNSPECIFIED HEART FAILURE TYPE: ICD-10-CM

## 2024-09-16 DIAGNOSIS — F41.1 GENERALIZED ANXIETY DISORDER: ICD-10-CM

## 2024-09-16 DIAGNOSIS — S22.060D COMPRESSION FRACTURE OF T8 VERTEBRA WITH ROUTINE HEALING, SUBSEQUENT ENCOUNTER: ICD-10-CM

## 2024-09-16 DIAGNOSIS — Z00.00 MEDICARE ANNUAL WELLNESS VISIT, SUBSEQUENT: Primary | ICD-10-CM

## 2024-09-16 DIAGNOSIS — E03.9 ADULT HYPOTHYROIDISM: ICD-10-CM

## 2024-09-16 DIAGNOSIS — E78.2 MIXED HYPERLIPIDEMIA: ICD-10-CM

## 2024-09-16 DIAGNOSIS — F33.2 SEVERE EPISODE OF RECURRENT MAJOR DEPRESSIVE DISORDER, WITHOUT PSYCHOTIC FEATURES (MULTI): ICD-10-CM

## 2024-09-16 DIAGNOSIS — Z12.11 ENCOUNTER FOR SCREENING FOR MALIGNANT NEOPLASM OF COLON: ICD-10-CM

## 2024-09-16 DIAGNOSIS — Z00.00 ROUTINE GENERAL MEDICAL EXAMINATION AT HEALTH CARE FACILITY: ICD-10-CM

## 2024-09-16 PROCEDURE — G0444 DEPRESSION SCREEN ANNUAL: HCPCS | Performed by: FAMILY MEDICINE

## 2024-09-16 PROCEDURE — 99397 PER PM REEVAL EST PAT 65+ YR: CPT | Performed by: FAMILY MEDICINE

## 2024-09-16 PROCEDURE — 1160F RVW MEDS BY RX/DR IN RCRD: CPT | Performed by: FAMILY MEDICINE

## 2024-09-16 PROCEDURE — 1123F ACP DISCUSS/DSCN MKR DOCD: CPT | Performed by: FAMILY MEDICINE

## 2024-09-16 PROCEDURE — 1170F FXNL STATUS ASSESSED: CPT | Performed by: FAMILY MEDICINE

## 2024-09-16 PROCEDURE — 1159F MED LIST DOCD IN RCRD: CPT | Performed by: FAMILY MEDICINE

## 2024-09-16 PROCEDURE — G0439 PPPS, SUBSEQ VISIT: HCPCS | Performed by: FAMILY MEDICINE

## 2024-09-16 PROCEDURE — 3008F BODY MASS INDEX DOCD: CPT | Performed by: FAMILY MEDICINE

## 2024-09-16 RX ORDER — IBANDRONATE SODIUM 150 MG/1
150 TABLET, FILM COATED ORAL
Qty: 1 TABLET | Refills: 11 | Status: SHIPPED | OUTPATIENT
Start: 2024-09-16 | End: 2025-09-16

## 2024-09-16 RX ORDER — TIZANIDINE 2 MG/1
2 TABLET ORAL EVERY 8 HOURS PRN
Qty: 30 TABLET | Refills: 1 | Status: SHIPPED | OUTPATIENT
Start: 2024-09-16

## 2024-09-16 RX ORDER — ATORVASTATIN CALCIUM 40 MG/1
40 TABLET, FILM COATED ORAL DAILY
Qty: 90 TABLET | Refills: 1 | Status: SHIPPED | OUTPATIENT
Start: 2024-09-16

## 2024-09-16 RX ORDER — MELOXICAM 15 MG/1
15 TABLET ORAL DAILY
Qty: 30 TABLET | Refills: 5 | Status: SHIPPED | OUTPATIENT
Start: 2024-09-16 | End: 2025-09-16

## 2024-09-16 RX ORDER — LEVOTHYROXINE SODIUM 25 UG/1
25 TABLET ORAL DAILY
Qty: 90 TABLET | Refills: 1 | Status: SHIPPED | OUTPATIENT
Start: 2024-09-16

## 2024-09-16 RX ORDER — CLOPIDOGREL BISULFATE 75 MG/1
75 TABLET ORAL DAILY
Qty: 90 TABLET | Refills: 1 | Status: SHIPPED | OUTPATIENT
Start: 2024-09-16

## 2024-09-16 RX ORDER — ESCITALOPRAM OXALATE 10 MG/1
10 TABLET ORAL DAILY
Qty: 90 TABLET | Refills: 1 | Status: SHIPPED | OUTPATIENT
Start: 2024-09-16 | End: 2025-03-15

## 2024-09-16 RX ORDER — BUPROPION HYDROCHLORIDE 300 MG/1
300 TABLET ORAL EVERY MORNING
Qty: 90 TABLET | Refills: 3 | Status: SHIPPED | OUTPATIENT
Start: 2024-09-16

## 2024-09-16 ASSESSMENT — LIFESTYLE VARIABLES
HOW MANY STANDARD DRINKS CONTAINING ALCOHOL DO YOU HAVE ON A TYPICAL DAY: 1 OR 2
HOW OFTEN DO YOU HAVE A DRINK CONTAINING ALCOHOL: MONTHLY OR LESS
SKIP TO QUESTIONS 9-10: 0
HOW OFTEN DO YOU HAVE SIX OR MORE DRINKS ON ONE OCCASION: LESS THAN MONTHLY
AUDIT-C TOTAL SCORE: 2

## 2024-09-16 ASSESSMENT — ANXIETY QUESTIONNAIRES
4. TROUBLE RELAXING: SEVERAL DAYS
2. NOT BEING ABLE TO STOP OR CONTROL WORRYING: MORE THAN HALF THE DAYS
6. BECOMING EASILY ANNOYED OR IRRITABLE: MORE THAN HALF THE DAYS
IF YOU CHECKED OFF ANY PROBLEMS ON THIS QUESTIONNAIRE, HOW DIFFICULT HAVE THESE PROBLEMS MADE IT FOR YOU TO DO YOUR WORK, TAKE CARE OF THINGS AT HOME, OR GET ALONG WITH OTHER PEOPLE: SOMEWHAT DIFFICULT
7. FEELING AFRAID AS IF SOMETHING AWFUL MIGHT HAPPEN: MORE THAN HALF THE DAYS
GAD7 TOTAL SCORE: 12
3. WORRYING TOO MUCH ABOUT DIFFERENT THINGS: MORE THAN HALF THE DAYS
5. BEING SO RESTLESS THAT IT IS HARD TO SIT STILL: NOT AT ALL
1. FEELING NERVOUS, ANXIOUS, OR ON EDGE: NEARLY EVERY DAY

## 2024-09-16 ASSESSMENT — PATIENT HEALTH QUESTIONNAIRE - PHQ9
10. IF YOU CHECKED OFF ANY PROBLEMS, HOW DIFFICULT HAVE THESE PROBLEMS MADE IT FOR YOU TO DO YOUR WORK, TAKE CARE OF THINGS AT HOME, OR GET ALONG WITH OTHER PEOPLE: SOMEWHAT DIFFICULT
1. LITTLE INTEREST OR PLEASURE IN DOING THINGS: SEVERAL DAYS
8. MOVING OR SPEAKING SO SLOWLY THAT OTHER PEOPLE COULD HAVE NOTICED. OR THE OPPOSITE, BEING SO FIGETY OR RESTLESS THAT YOU HAVE BEEN MOVING AROUND A LOT MORE THAN USUAL: NOT AT ALL
5. POOR APPETITE OR OVEREATING: NOT AT ALL
SUM OF ALL RESPONSES TO PHQ QUESTIONS 1-9: 12
2. FEELING DOWN, DEPRESSED OR HOPELESS: MORE THAN HALF THE DAYS
7. TROUBLE CONCENTRATING ON THINGS, SUCH AS READING THE NEWSPAPER OR WATCHING TELEVISION: MORE THAN HALF THE DAYS
SUM OF ALL RESPONSES TO PHQ9 QUESTIONS 1 AND 2: 3
9. THOUGHTS THAT YOU WOULD BE BETTER OFF DEAD, OR OF HURTING YOURSELF: MORE THAN HALF THE DAYS
3. TROUBLE FALLING OR STAYING ASLEEP OR SLEEPING TOO MUCH: SEVERAL DAYS
6. FEELING BAD ABOUT YOURSELF - OR THAT YOU ARE A FAILURE OR HAVE LET YOURSELF OR YOUR FAMILY DOWN: MORE THAN HALF THE DAYS
4. FEELING TIRED OR HAVING LITTLE ENERGY: MORE THAN HALF THE DAYS

## 2024-09-16 ASSESSMENT — ACTIVITIES OF DAILY LIVING (ADL)
DOING_HOUSEWORK: INDEPENDENT
DRESSING: INDEPENDENT
GROCERY_SHOPPING: INDEPENDENT
TAKING_MEDICATION: INDEPENDENT
BATHING: INDEPENDENT
MANAGING_FINANCES: INDEPENDENT

## 2024-09-16 ASSESSMENT — ENCOUNTER SYMPTOMS
LOSS OF SENSATION IN FEET: 0
DEPRESSION: 0
OCCASIONAL FEELINGS OF UNSTEADINESS: 0

## 2024-09-16 NOTE — ASSESSMENT & PLAN NOTE
Stable on current regimen, rechecking TSH.  Orders:    levothyroxine (Synthroid, Levoxyl) 25 mcg tablet; Take 1 tablet (25 mcg) by mouth once daily.

## 2024-09-16 NOTE — ASSESSMENT & PLAN NOTE
Stable on current regimen.  Follow up 6 months.  Orders:    clopidogrel (Plavix) 75 mg tablet; Take 1 tablet (75 mg) by mouth once daily.

## 2024-09-16 NOTE — ASSESSMENT & PLAN NOTE
Stable - continue current regimen.  Discussed referral to Behavioral Health Collaborative Care Team   Follow up at least annually.  Orders:    escitalopram (Lexapro) 10 mg tablet; Take 1 tablet (10 mg) by mouth once daily.

## 2024-09-16 NOTE — ASSESSMENT & PLAN NOTE
Stable - continue with current regimen.  Follow up at least annually.  Discussed referral to Behavioral Health Collaborative Care Team   Orders:    buPROPion XL (Wellbutrin XL) 300 mg 24 hr tablet; Take 1 tablet (300 mg) by mouth once daily in the morning.

## 2024-09-16 NOTE — PROGRESS NOTES
"Subjective   Reason for Visit: Julissa New is an 68 y.o. female here for a Medicare Wellness visit.     Past Medical, Surgical, and Family History reviewed and updated in chart.    Reviewed all medications by prescribing practitioner or clinical pharmacist (such as prescriptions, OTCs, herbal therapies and supplements) and documented in the medical record.    HPI    SOC Hx:   Tobacco Use: High Risk (9/16/2024)    Patient History     Smoking Tobacco Use: Every Day     Smokeless Tobacco Use: Never     Passive Exposure: Not on file     Alcohol Use: Not At Risk (9/16/2024)    AUDIT-C     Frequency of Alcohol Consumption: Monthly or less     Average Number of Drinks: 1 or 2     Frequency of Binge Drinking: Less than monthly     DIET: general    EXERCISE:  not regularly    ELIMINATION: no constipation or diarrhea  COLON CA SCREENING: COLONOSCOPY 6/13/2015 REPEAT DUE 2 years    URINARY SYSTEM: none    SLEEP:  disturbed    MOODS: Anxiety and depression worsening off medications  Stopped all her medications approximately 3 months ago.  Patient Health Questionnaire-9 Score: 12  DICK-7 Total Score: 12     OB/GYN: LMP: No LMP recorded. Patient is postmenopausal.  MAMMO:  declines  DEXA: 1/2024 - osteoporosis    Review of Systems    Objective   Vitals:  /84 (BP Location: Left arm, Patient Position: Sitting, BP Cuff Size: Adult)   Pulse 67   Ht 1.676 m (5' 6\")   Wt 62.1 kg (137 lb)   SpO2 100%   BMI 22.11 kg/m²       Physical Exam  Constitutional:       General: She is not in acute distress.     Comments: Cachetic   HENT:      Head: Normocephalic and atraumatic.      Right Ear: Tympanic membrane, ear canal and external ear normal.      Left Ear: Tympanic membrane, ear canal and external ear normal.      Nose: Nose normal.      Mouth/Throat:      Mouth: Mucous membranes are moist.      Pharynx: No oropharyngeal exudate or posterior oropharyngeal erythema.   Eyes:      Extraocular Movements: Extraocular " movements intact.      Conjunctiva/sclera: Conjunctivae normal.      Pupils: Pupils are equal, round, and reactive to light.   Cardiovascular:      Rate and Rhythm: Normal rate and regular rhythm.      Heart sounds: No murmur heard.  Pulmonary:      Effort: Pulmonary effort is normal.      Breath sounds: Normal breath sounds.   Abdominal:      General: Bowel sounds are normal.      Palpations: Abdomen is soft.   Musculoskeletal:         General: Normal range of motion.      Cervical back: No rigidity.   Lymphadenopathy:      Cervical: No cervical adenopathy.   Skin:     General: Skin is warm and dry.      Findings: No rash.   Neurological:      General: No focal deficit present.      Mental Status: She is alert and oriented to person, place, and time.      Cranial Nerves: No cranial nerve deficit.      Gait: Gait normal.   Psychiatric:         Mood and Affect: Mood normal.         Behavior: Behavior normal.         Assessment & Plan  Medicare annual wellness visit, subsequent          Routine general medical examination at [facility] facility    Orders:    1 Year Follow Up In [facility] - PCP - Wellness Exam; Future    Mixed hyperlipidemia  Stable.  Continue current Atorvastatin dosage.  Orders:    atorvastatin (Lipitor) 40 mg tablet; Take 1 tablet (40 mg) by mouth once daily.    Generalized anxiety disorder  Stable - continue with current regimen.  Follow up at least annually.  Discussed referral to Behavioral Health Collaborative Care Team   Orders:    buPROPion XL (Wellbutrin XL) 300 mg 24 hr tablet; Take 1 tablet (300 mg) by mouth once daily in the morning.    Hypertensive heart disease with congestive heart failure, unspecified heart failure type (Multi)  Stable on current regimen.  Follow up 6 months.  Orders:    clopidogrel (Plavix) 75 mg tablet; Take 1 tablet (75 mg) by mouth once daily.    Anxiety disorder, unspecified type    Orders:    escitalopram (Lexapro) 10 mg tablet; Take 1 tablet (10 mg)  by mouth once daily.    Severe episode of recurrent major depressive disorder, without psychotic features (Multi)  Stable - continue current regimen.  Discussed referral to Behavioral Health Collaborative Care Team   Follow up at least annually.  Orders:    escitalopram (Lexapro) 10 mg tablet; Take 1 tablet (10 mg) by mouth once daily.    Adult hypothyroidism  Stable on current regimen, rechecking TSH.  Orders:    levothyroxine (Synthroid, Levoxyl) 25 mcg tablet; Take 1 tablet (25 mcg) by mouth once daily.    Compression fracture of T8 vertebra with routine healing, subsequent encounter  Continue with prn NSAIDs and muscle relaxer.  Orders:    meloxicam (Mobic) 15 mg tablet; Take 1 tablet (15 mg) by mouth once daily.    tiZANidine (Zanaflex) 2 mg tablet; Take 1 tablet (2 mg) by mouth every 8 hours if needed for muscle spasms.    Age-related osteoporosis with current pathological fracture, initial encounter    Orders:    ibandronate (Boniva) 150 mg tablet; Take 1 tablet (150 mg) by mouth every 30 (thirty) days. Take in morning with full glass of water on an empty stomach. No food, drink, meds, or lying down for 60 minutes after.    Encounter for screening for malignant neoplasm of colon    Orders:    Colonoscopy Screening; Average Risk Patient; Future      Depression Screening  5 - 10 minutes were spent screening for depression.     Follow-up  1 month - sooner with any problems or concerns.

## 2024-09-16 NOTE — ASSESSMENT & PLAN NOTE
Stable.  Continue current Atorvastatin dosage.  Orders:    atorvastatin (Lipitor) 40 mg tablet; Take 1 tablet (40 mg) by mouth once daily.

## 2024-09-25 ASSESSMENT — LIFESTYLE VARIABLES
HOW OFTEN DO YOU HAVE SIX OR MORE DRINKS ON ONE OCCASION: LESS THAN MONTHLY
HOW MANY STANDARD DRINKS CONTAINING ALCOHOL DO YOU HAVE ON A TYPICAL DAY: 1 OR 2
HOW OFTEN DO YOU HAVE A DRINK CONTAINING ALCOHOL: 2-4 TIMES A MONTH
SKIP TO QUESTIONS 9-10: 0
AUDIT-C TOTAL SCORE: 3

## 2024-09-25 ASSESSMENT — PATIENT HEALTH QUESTIONNAIRE - PHQ9
1. LITTLE INTEREST OR PLEASURE IN DOING THINGS: SEVERAL DAYS
3. TROUBLE FALLING OR STAYING ASLEEP OR SLEEPING TOO MUCH: SEVERAL DAYS
9. THOUGHTS THAT YOU WOULD BE BETTER OFF DEAD, OR OF HURTING YOURSELF: MORE THAN HALF THE DAYS
2. FEELING DOWN, DEPRESSED OR HOPELESS: MORE THAN HALF THE DAYS
6. FEELING BAD ABOUT YOURSELF - OR THAT YOU ARE A FAILURE OR HAVE LET YOURSELF OR YOUR FAMILY DOWN: MORE THAN HALF THE DAYS
8. MOVING OR SPEAKING SO SLOWLY THAT OTHER PEOPLE COULD HAVE NOTICED. OR THE OPPOSITE, BEING SO FIGETY OR RESTLESS THAT YOU HAVE BEEN MOVING AROUND A LOT MORE THAN USUAL: NOT AT ALL
3. TROUBLE FALLING OR STAYING ASLEEP OR SLEEPING TOO MUCH: SEVERAL DAYS
5. POOR APPETITE OR OVEREATING: NOT AT ALL
1. LITTLE INTEREST OR PLEASURE IN DOING THINGS: SEVERAL DAYS
5. POOR APPETITE OR OVEREATING: NOT AT ALL
SUM OF ALL RESPONSES TO PHQ9 QUESTIONS 1 AND 2: 3
2. FEELING DOWN, DEPRESSED OR HOPELESS: MORE THAN HALF THE DAYS
4. FEELING TIRED OR HAVING LITTLE ENERGY: MORE THAN HALF THE DAYS
8. MOVING OR SPEAKING SO SLOWLY THAT OTHER PEOPLE COULD HAVE NOTICED. OR THE OPPOSITE, BEING SO FIGETY OR RESTLESS THAT YOU HAVE BEEN MOVING AROUND A LOT MORE THAN USUAL: NOT AT ALL
6. FEELING BAD ABOUT YOURSELF - OR THAT YOU ARE A FAILURE OR HAVE LET YOURSELF OR YOUR FAMILY DOWN: MORE THAN HALF THE DAYS
7. TROUBLE CONCENTRATING ON THINGS, SUCH AS READING THE NEWSPAPER OR WATCHING TELEVISION: MORE THAN HALF THE DAYS
SUM OF ALL RESPONSES TO PHQ9 QUESTIONS 1 AND 2: 3
7. TROUBLE CONCENTRATING ON THINGS, SUCH AS READING THE NEWSPAPER OR WATCHING TELEVISION: NOT AT ALL
SUM OF ALL RESPONSES TO PHQ QUESTIONS 1-9: 10
4. FEELING TIRED OR HAVING LITTLE ENERGY: MORE THAN HALF THE DAYS

## 2024-10-14 ENCOUNTER — APPOINTMENT (OUTPATIENT)
Dept: PRIMARY CARE | Facility: CLINIC | Age: 69
End: 2024-10-14
Payer: MEDICARE

## 2024-10-14 VITALS
BODY MASS INDEX: 22.14 KG/M2 | WEIGHT: 137.2 LBS | DIASTOLIC BLOOD PRESSURE: 80 MMHG | OXYGEN SATURATION: 99 % | HEART RATE: 62 BPM | SYSTOLIC BLOOD PRESSURE: 138 MMHG

## 2024-10-14 DIAGNOSIS — E78.2 MIXED HYPERLIPIDEMIA: ICD-10-CM

## 2024-10-14 DIAGNOSIS — S22.060D COMPRESSION FRACTURE OF T8 VERTEBRA WITH ROUTINE HEALING, SUBSEQUENT ENCOUNTER: Primary | ICD-10-CM

## 2024-10-14 DIAGNOSIS — E46 PROTEIN-CALORIE MALNUTRITION, UNSPECIFIED SEVERITY (MULTI): ICD-10-CM

## 2024-10-14 DIAGNOSIS — C67.2 MALIGNANT NEOPLASM OF LATERAL WALL OF URINARY BLADDER (MULTI): ICD-10-CM

## 2024-10-14 PROCEDURE — G2211 COMPLEX E/M VISIT ADD ON: HCPCS | Performed by: FAMILY MEDICINE

## 2024-10-14 PROCEDURE — 99213 OFFICE O/P EST LOW 20 MIN: CPT | Performed by: FAMILY MEDICINE

## 2024-10-14 PROCEDURE — 1123F ACP DISCUSS/DSCN MKR DOCD: CPT | Performed by: FAMILY MEDICINE

## 2024-10-14 PROCEDURE — 1159F MED LIST DOCD IN RCRD: CPT | Performed by: FAMILY MEDICINE

## 2024-10-14 PROCEDURE — 1160F RVW MEDS BY RX/DR IN RCRD: CPT | Performed by: FAMILY MEDICINE

## 2024-10-14 RX ORDER — ACETAMINOPHEN AND CODEINE PHOSPHATE 300; 30 MG/1; MG/1
1 TABLET ORAL EVERY 6 HOURS PRN
Qty: 20 TABLET | Refills: 0 | Status: SHIPPED | OUTPATIENT
Start: 2024-10-14 | End: 2024-10-21

## 2024-10-14 ASSESSMENT — ENCOUNTER SYMPTOMS
DEPRESSION: 0
LOSS OF SENSATION IN FEET: 0
OCCASIONAL FEELINGS OF UNSTEADINESS: 0

## 2024-10-14 NOTE — PROGRESS NOTES
"Subjective   Patient ID: Julissa New \"Lisa\" is a 68 y.o. female who presents for Follow-up (1 month follow up. ).    HPI    HYPERLIPIDEMIA:  Patient is tolerating regular statin dosing without muscle achiness or weakness.  Review of last fasting lipids with good control.  Lab Results   Component Value Date    CHOL 175 08/19/2024     Lab Results   Component Value Date    HDL 46.1 08/19/2024     Lab Results   Component Value Date    LDLCALC 110 (H) 08/19/2024     Lab Results   Component Value Date    TRIG 94 08/19/2024     No components found for: \"CHOLHDL\"       ANXIETY/DEPRESSION:  fairly well controlled on Citalopram and Bupropion    Sleeping  fairly well but going to bed around 3 am on the regular  Sleeping in but not really napping during the day.    Review of Systems    Review of Systems negative except as noted in HPI and Chief complaint.     Objective             VITALS:  /80 (BP Location: Left arm, Patient Position: Sitting, BP Cuff Size: Adult)   Pulse 62   Wt 62.2 kg (137 lb 3.2 oz)   SpO2 99%   BMI 22.14 kg/m²      Physical Exam  Constitutional:       General: She is not in acute distress.     Appearance: Normal appearance. She is not ill-appearing.   HENT:      Head: Normocephalic and atraumatic.   Neck:      Vascular: No carotid bruit.   Cardiovascular:      Rate and Rhythm: Normal rate and regular rhythm.      Pulses: Normal pulses.      Heart sounds: Normal heart sounds. No murmur heard.     No gallop.   Pulmonary:      Effort: Pulmonary effort is normal.      Breath sounds: Normal breath sounds. No wheezing, rhonchi or rales.   Musculoskeletal:      Cervical back: Normal range of motion and neck supple. No rigidity or tenderness.   Lymphadenopathy:      Cervical: No cervical adenopathy.   Skin:     General: Skin is warm and dry.   Neurological:      Mental Status: She is alert.   Psychiatric:         Mood and Affect: Mood normal.         Behavior: Behavior normal. "         Assessment/Plan   Problem List Items Addressed This Visit       Mixed hyperlipidemia    Malignant neoplasm of lateral wall of urinary bladder (Multi)    Relevant Orders    Referral to Urology    Protein-calorie malnutrition, unspecified severity (Multi)     Other Visit Diagnoses       Compression fracture of T8 vertebra with routine healing, subsequent encounter    -  Primary        Refilling acute dosage of Tylenol with codeine - advised to avoid all alcohol use while on this medication.    FOLLOW UP 3 MONTHS, SOONER WITH ANY PROBLEMS OR CONCERNS.

## 2024-11-09 PROBLEM — S22.060D COMPRESSION FRACTURE OF T8 VERTEBRA WITH ROUTINE HEALING: Status: ACTIVE | Noted: 2024-11-09

## 2024-11-09 NOTE — ASSESSMENT & PLAN NOTE
Acute exacerbation of pain treated today with short course of Tylenol with codeine.  Advised to avoid all alcohol use while taking this medication.

## 2024-11-09 NOTE — ASSESSMENT & PLAN NOTE
Reviewed dietary modifications - increased lean proteins and plant based foods.  Discussed protein supplemental drinks or shakes.

## 2025-01-13 ENCOUNTER — APPOINTMENT (OUTPATIENT)
Dept: PRIMARY CARE | Facility: CLINIC | Age: 70
End: 2025-01-13
Payer: MEDICARE

## 2025-01-13 VITALS
BODY MASS INDEX: 23.18 KG/M2 | DIASTOLIC BLOOD PRESSURE: 84 MMHG | WEIGHT: 143.6 LBS | OXYGEN SATURATION: 95 % | SYSTOLIC BLOOD PRESSURE: 124 MMHG | HEART RATE: 79 BPM

## 2025-01-13 DIAGNOSIS — J43.9 PULMONARY EMPHYSEMA, UNSPECIFIED EMPHYSEMA TYPE (MULTI): ICD-10-CM

## 2025-01-13 DIAGNOSIS — S22.060D COMPRESSION FRACTURE OF T8 VERTEBRA WITH ROUTINE HEALING: ICD-10-CM

## 2025-01-13 DIAGNOSIS — I11.0 HYPERTENSIVE HEART DISEASE WITH CONGESTIVE HEART FAILURE, UNSPECIFIED HEART FAILURE TYPE: ICD-10-CM

## 2025-01-13 DIAGNOSIS — Z00.00 MEDICARE ANNUAL WELLNESS VISIT, SUBSEQUENT: Primary | ICD-10-CM

## 2025-01-13 DIAGNOSIS — C67.9 MALIGNANT NEOPLASM OF URINARY BLADDER, UNSPECIFIED SITE (MULTI): ICD-10-CM

## 2025-01-13 DIAGNOSIS — F33.2 SEVERE EPISODE OF RECURRENT MAJOR DEPRESSIVE DISORDER, WITHOUT PSYCHOTIC FEATURES (MULTI): ICD-10-CM

## 2025-01-13 DIAGNOSIS — Z00.00 ANNUAL PHYSICAL EXAM: ICD-10-CM

## 2025-01-13 DIAGNOSIS — E78.2 MIXED HYPERLIPIDEMIA: ICD-10-CM

## 2025-01-13 DIAGNOSIS — N18.31 STAGE 3A CHRONIC KIDNEY DISEASE (MULTI): ICD-10-CM

## 2025-01-13 DIAGNOSIS — F33.1 MODERATE EPISODE OF RECURRENT MAJOR DEPRESSIVE DISORDER: ICD-10-CM

## 2025-01-13 DIAGNOSIS — M17.0 OSTEOARTHRITIS OF BOTH KNEES, UNSPECIFIED OSTEOARTHRITIS TYPE: ICD-10-CM

## 2025-01-13 DIAGNOSIS — K51.90 ULCERATIVE COLITIS WITHOUT COMPLICATIONS, UNSPECIFIED LOCATION (MULTI): ICD-10-CM

## 2025-01-13 PROCEDURE — 99214 OFFICE O/P EST MOD 30 MIN: CPT | Performed by: FAMILY MEDICINE

## 2025-01-13 PROCEDURE — G0444 DEPRESSION SCREEN ANNUAL: HCPCS | Performed by: FAMILY MEDICINE

## 2025-01-13 PROCEDURE — G0439 PPPS, SUBSEQ VISIT: HCPCS | Performed by: FAMILY MEDICINE

## 2025-01-13 PROCEDURE — 1123F ACP DISCUSS/DSCN MKR DOCD: CPT | Performed by: FAMILY MEDICINE

## 2025-01-13 PROCEDURE — 4004F PT TOBACCO SCREEN RCVD TLK: CPT | Performed by: FAMILY MEDICINE

## 2025-01-13 PROCEDURE — 1160F RVW MEDS BY RX/DR IN RCRD: CPT | Performed by: FAMILY MEDICINE

## 2025-01-13 PROCEDURE — 1170F FXNL STATUS ASSESSED: CPT | Performed by: FAMILY MEDICINE

## 2025-01-13 PROCEDURE — 1159F MED LIST DOCD IN RCRD: CPT | Performed by: FAMILY MEDICINE

## 2025-01-13 ASSESSMENT — ACTIVITIES OF DAILY LIVING (ADL)
BATHING: INDEPENDENT
DRESSING: INDEPENDENT
TAKING_MEDICATION: INDEPENDENT
MANAGING_FINANCES: INDEPENDENT
DOING_HOUSEWORK: INDEPENDENT
GROCERY_SHOPPING: INDEPENDENT

## 2025-01-13 ASSESSMENT — LIFESTYLE VARIABLES
SKIP TO QUESTIONS 9-10: 0
HOW OFTEN DO YOU HAVE SIX OR MORE DRINKS ON ONE OCCASION: LESS THAN MONTHLY
AUDIT-C TOTAL SCORE: 3
HOW MANY STANDARD DRINKS CONTAINING ALCOHOL DO YOU HAVE ON A TYPICAL DAY: 1 OR 2
HOW OFTEN DO YOU HAVE A DRINK CONTAINING ALCOHOL: 2-4 TIMES A MONTH

## 2025-01-13 ASSESSMENT — ENCOUNTER SYMPTOMS
DEPRESSION: 0
LOSS OF SENSATION IN FEET: 0
OCCASIONAL FEELINGS OF UNSTEADINESS: 0

## 2025-01-13 ASSESSMENT — PATIENT HEALTH QUESTIONNAIRE - PHQ9
SUM OF ALL RESPONSES TO PHQ QUESTIONS 1-9: 14
4. FEELING TIRED OR HAVING LITTLE ENERGY: MORE THAN HALF THE DAYS
9. THOUGHTS THAT YOU WOULD BE BETTER OFF DEAD, OR OF HURTING YOURSELF: SEVERAL DAYS
SUM OF ALL RESPONSES TO PHQ9 QUESTIONS 1 AND 2: 4
5. POOR APPETITE OR OVEREATING: SEVERAL DAYS
6. FEELING BAD ABOUT YOURSELF - OR THAT YOU ARE A FAILURE OR HAVE LET YOURSELF OR YOUR FAMILY DOWN: NOT AT ALL
10. IF YOU CHECKED OFF ANY PROBLEMS, HOW DIFFICULT HAVE THESE PROBLEMS MADE IT FOR YOU TO DO YOUR WORK, TAKE CARE OF THINGS AT HOME, OR GET ALONG WITH OTHER PEOPLE: SOMEWHAT DIFFICULT
7. TROUBLE CONCENTRATING ON THINGS, SUCH AS READING THE NEWSPAPER OR WATCHING TELEVISION: MORE THAN HALF THE DAYS
2. FEELING DOWN, DEPRESSED OR HOPELESS: MORE THAN HALF THE DAYS
8. MOVING OR SPEAKING SO SLOWLY THAT OTHER PEOPLE COULD HAVE NOTICED. OR THE OPPOSITE, BEING SO FIGETY OR RESTLESS THAT YOU HAVE BEEN MOVING AROUND A LOT MORE THAN USUAL: SEVERAL DAYS
1. LITTLE INTEREST OR PLEASURE IN DOING THINGS: MORE THAN HALF THE DAYS
3. TROUBLE FALLING OR STAYING ASLEEP OR SLEEPING TOO MUCH: NEARLY EVERY DAY

## 2025-01-13 ASSESSMENT — ANXIETY QUESTIONNAIRES
3. WORRYING TOO MUCH ABOUT DIFFERENT THINGS: SEVERAL DAYS
5. BEING SO RESTLESS THAT IT IS HARD TO SIT STILL: NEARLY EVERY DAY
7. FEELING AFRAID AS IF SOMETHING AWFUL MIGHT HAPPEN: SEVERAL DAYS
6. BECOMING EASILY ANNOYED OR IRRITABLE: NOT AT ALL
4. TROUBLE RELAXING: MORE THAN HALF THE DAYS
GAD7 TOTAL SCORE: 10
IF YOU CHECKED OFF ANY PROBLEMS ON THIS QUESTIONNAIRE, HOW DIFFICULT HAVE THESE PROBLEMS MADE IT FOR YOU TO DO YOUR WORK, TAKE CARE OF THINGS AT HOME, OR GET ALONG WITH OTHER PEOPLE: SOMEWHAT DIFFICULT
1. FEELING NERVOUS, ANXIOUS, OR ON EDGE: SEVERAL DAYS
2. NOT BEING ABLE TO STOP OR CONTROL WORRYING: MORE THAN HALF THE DAYS

## 2025-01-13 NOTE — ASSESSMENT & PLAN NOTE
Stable  Chronic condition documentation: stable based on last GFR.   Continue established treatment plan including avoidance of nephrotoxins including NSAIDs, and limiting red meat intake to 1-3 times per month.  Avoid contrast if possible. If to be done- we recommend holding ACEi/ARBS/diuretics 24 hrs prior to contrast exposure and ensure appropriate hydration   Avoid tobacco use, limit salt intake and continue with adequate hydration.  Follow-up at least yearly.

## 2025-01-13 NOTE — ASSESSMENT & PLAN NOTE
Worsening - setting up with Behavioral Health Collaborative Care Team again.  Encouraging her to resume medications at current dosages for now.  I have discussed the collaborative care model for this patient's behavioral health care.  Written detailed information and identifying the members of this care team was provided to patient.  They give permission for the Behavioral Health Manager (BHM) and psychiatric consultant to be included in their care with my continued primary management.  Patient made aware that services provided as part of the Collaborative Care Model are subject to insurance billing.    Orders:    Follow Up In [facility] - Behavioral Health Collaborative Care CoCM; Future

## 2025-01-13 NOTE — PROGRESS NOTES
Subjective   Reason for Visit: Julissa New is an 69 y.o. female here for a Medicare Wellness visit.     Past Medical, Surgical, and Family History reviewed and updated in chart.    Reviewed all medications by prescribing practitioner or clinical pharmacist (such as prescriptions, OTCs, herbal therapies and supplements) and documented in the medical record.    HPI    HYPOTHYROID: not taking her medications  No recent labs    DEPRESSION: stressors at home continue  She stopped taking her medications - admittedly stopped almost a year ago  Patient Health Questionnaire-9 Score: 14  DICK-7 Total Score: 10     CHRONIC PAIN WITH H/O COMPRESSION FRACTURE SPINE:    SOC Hx:   Tobacco Use: High Risk (1/13/2025)    Patient History     Smoking Tobacco Use: Every Day     Smokeless Tobacco Use: Never     Passive Exposure: Not on file     Alcohol Use: Alcohol Misuse (1/13/2025)    AUDIT-C     Frequency of Alcohol Consumption: 2-4 times a month     Average Number of Drinks: 1 or 2     Frequency of Binge Drinking: Less than monthly       DIET: general    EXERCISE: The patient does not participate in regular exercise at present.    ELIMINATION: no constipation or diarrhea  COLON CA SCREENING: COLONOSCOPY 6/3/2015 REPEAT DUE 5 years  Ordered September 2024 - patient has not scheduled yet    URINARY SYSTEM: none    SLEEP:   staying up late - usually goes to bed around 3 am and up by noon    MOODS: depression worsening - stopped medications quite a while ago  Denies any suicidal ideation  She has worked with our Behavioral Health Collaborative Care Team in the past and is willing to restart.  Patient Health Questionnaire-9 Score: 14     DICK-7 Total Score: 10     OB/GYN: LMP: No LMP recorded. Patient is postmenopausal.  Menstrual cycles - menopausal  MAMMO:  N/A  DEXA:1/17/2024      Patient Care Team:  Yarely Mitchell DO as PCP - General (Family Medicine)  Yarely Mitchell DO as PCP - [employer] PCP  Yarely Mitchell  DO (Family Medicine)     Review of Systems    Objective   Vitals:  /84 (BP Location: Left arm, Patient Position: Sitting, BP Cuff Size: Adult)   Pulse 79   Wt 65.1 kg (143 lb 9.6 oz)   SpO2 95%   BMI 23.18 kg/m²       Physical Exam  Constitutional:       General: She is not in acute distress.     Appearance: Normal appearance. She is not ill-appearing.   HENT:      Head: Normocephalic and atraumatic.   Neck:      Vascular: No carotid bruit.   Cardiovascular:      Rate and Rhythm: Normal rate and regular rhythm.      Pulses: Normal pulses.      Heart sounds: Normal heart sounds. No murmur heard.     No gallop.   Pulmonary:      Effort: Pulmonary effort is normal.      Breath sounds: Normal breath sounds. No wheezing, rhonchi or rales.   Musculoskeletal:      Cervical back: Normal range of motion and neck supple. No rigidity or tenderness.   Lymphadenopathy:      Cervical: No cervical adenopathy.   Skin:     General: Skin is warm and dry.   Neurological:      Mental Status: She is alert.   Psychiatric:         Mood and Affect: Mood normal.         Behavior: Behavior normal.         Assessment & Plan  Medicare annual wellness visit, subsequent    Orders:    1 Year Follow Up In [facility] - PCP - Wellness Exam; Future    Compression fracture of T8 vertebra with routine healing  Stable - no worsening pain currently  Falls risks reviewed - precautions recommended.  Follow up prn.       Osteoarthritis of both knees, unspecified osteoarthritis type  Stable - continue with prn Tylenol and call if symptoms worsening.  Follow up prn.  Orders:    Disability Placard    Severe episode of recurrent major depressive disorder, without psychotic features (Multi)         Malignant neoplasm of urinary bladder, unspecified site (Multi)    Orders:    Referral to Urology; Future    Hypertensive heart disease with congestive heart failure, unspecified heart failure type  Stable - resuming medications as  prescribed  Patient states she has meds at home and will restart.       Pulmonary emphysema, unspecified emphysema type (Multi)  Stable.  No changes to regimen.  Follow up at least annually, sooner if symptoms worsen.       Ulcerative colitis without complications, unspecified location (Multi)  Stable.  Continue dietary management and follow up with GI next year as recommended - sooner if symptoms flare.       Stage 3a chronic kidney disease (Multi)  Stable  Chronic condition documentation: stable based on last GFR.   Continue established treatment plan including avoidance of nephrotoxins including NSAIDs, and limiting red meat intake to 1-3 times per month.  Avoid contrast if possible. If to be done- we recommend holding ACEi/ARBS/diuretics 24 hrs prior to contrast exposure and ensure appropriate hydration   Avoid tobacco use, limit salt intake and continue with adequate hydration.  Follow-up at least yearly.         Annual physical exam    Orders:    TSH with reflex to Free T4 if abnormal; Future    Mixed hyperlipidemia  Stable - resume medications and repeat labs in 6-8 weeks.  Follow up at least annually, sooner if any changes in results.  Orders:    Comprehensive Metabolic Panel; Future    Lipid Panel; Future    Moderate episode of recurrent major depressive disorder  Worsening - setting up with Behavioral Health Collaborative Care Team again.  Encouraging her to resume medications at current dosages for now.  I have discussed the collaborative care model for this patient's behavioral health care.  Written detailed information and identifying the members of this care team was provided to patient.  They give permission for the Behavioral Health Manager (BHM) and psychiatric consultant to be included in their care with my continued primary management.  Patient made aware that services provided as part of the Collaborative Care Model are subject to insurance billing.    Orders:    Follow Up In [facility] - Behavioral Health Collaborative Care CoCM; Future    BMI 23.0-23.9, adult                Depression Screening  5 - 10 minutes were spent screening for depression.    Tobacco Counseling  3 - 5 minutes were spent counseling the patient on tobacco cessation.  Benefits of cessation were discussed as well as techniques to help quit.        Also setting up with our Chronic Care Management team.  Our RN Case manager met with her today to introduce the program.    Medicare Wellness Billing Compliance Satisfied    *This is a visual tool to show completion of required items on the day of the visit. Green checks will only appear on the date of visit.    Review all medications by prescribing practitioner or clinical pharmacist (such as prescriptions, OTCs, herbal therapies and supplements) documented in the medical record    Past Medical, Surgical, and Family History reviewed and updated in chart    Tobacco Use Reviewed    Alcohol Use Reviewed    Illicit Drug Use Reviewed    PHQ2/9    Falls in Last Year Reviewed    Home Safety Risk Factors Reviewed    Cognitive Impairment Reviewed    Patient Self Assessment and Health Status    Current Diet Reviewed    Exercise Frequency    ADL - Hearing Impairment    ADL - Bathing    ADL - Dressing    ADL - Walks in Home    IADL - Managing Finances    IADL - Grocery Shopping    IADL - Taking Medications    IADL - Doing Housework      Last BMI  01/13/25 : 23.18 kg/m²          Follow-up 1 month.

## 2025-01-13 NOTE — ASSESSMENT & PLAN NOTE
Stable - resuming medications as prescribed  Patient states she has meds at home and will restart.

## 2025-01-13 NOTE — ASSESSMENT & PLAN NOTE
Stable - no worsening pain currently  Falls risks reviewed - precautions recommended.  Follow up prn.

## 2025-01-13 NOTE — PATIENT INSTRUCTIONS
January 13, 2025    Dear Ms. New,    We would like to invite you to participate in our Chronic Care Management program with the goal of improving your health. We will match you up with a care manager who will work with you to keep your chronic conditions under control. Please call your clinic or log on to [facility] if you have any questions or would like to enroll.    We look forward to working toward a healthier you together.    Sincerely,        CC: [unfilled]

## 2025-01-13 NOTE — ASSESSMENT & PLAN NOTE
Stable.  Continue dietary management and follow up with GI next year as recommended - sooner if symptoms flare.

## 2025-01-13 NOTE — ASSESSMENT & PLAN NOTE
Stable - continue with prn Tylenol and call if symptoms worsening.  Follow up prn.  Orders:    Disability Placard

## 2025-01-14 ENCOUNTER — DOCUMENTATION (OUTPATIENT)
Dept: PRIMARY CARE | Facility: CLINIC | Age: 70
End: 2025-01-14
Payer: MEDICARE

## 2025-01-15 ENCOUNTER — APPOINTMENT (OUTPATIENT)
Dept: OPHTHALMOLOGY | Facility: CLINIC | Age: 70
End: 2025-01-15
Payer: MEDICARE

## 2025-01-17 ASSESSMENT — SLIT LAMP EXAM - LIDS
COMMENTS: GOOD POSITION
COMMENTS: GOOD POSITION

## 2025-01-17 ASSESSMENT — CUP TO DISC RATIO
OS_RATIO: .25
OD_RATIO: .3

## 2025-01-17 ASSESSMENT — EXTERNAL EXAM - RIGHT EYE: OD_EXAM: NORMAL

## 2025-01-17 ASSESSMENT — EXTERNAL EXAM - LEFT EYE: OS_EXAM: NORMAL

## 2025-01-17 NOTE — PROGRESS NOTES
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 UV sunglasses when outside. Discussed smoking cessation (patient has been smoking since age 10) - patient has cut down significantly over time. Would recommend continuing AREDS2.   -OCT macula (1/10/24) - SS: 10/10 OD and 8/10 OS. Normal thickness and contour OU.  Medium/large drusen OD, small/medium drusen OS. 267/264. Possible increase in size and number of drusen compared to 12/21/22.   -Continue seeing Dr. Martino annually alternating with annual visits with me. F/u with Dr. Martino in 6 months and return to see me in 1 year for comprehensive exam with OCT macula.    PVD (posterior vitreous detachment), right eyeH43.811  PVD (posterior vitreous detachment), left eyeH43.812  -History of floater and flashes OD 9/2022  -No retinal tear or detachment seen on exam. Monitor with routine dilated exams.     Combined form of age-related cataract, left eyeH25.812  -Not visually significant at this time. Monitor.     PCO (posterior capsular opacification), vdnayG82.491  Pseudophakia of right eyeZ96.1 - 2/25/21  -Doing well. Good vision. IOP good. Monitor.   -Mild PCO OD, not visually significant at this time. Monitor and can consider YAG capsulotomy in the future if condition worsens.   -Was on POLYTRIM due to allergy to levaquin.     GjnuygpndN94.00  AmwvryjaujN83.4  -Continue preoperative glasses - similar Rx as needed now postoperatively. Declines new Rx at this time.

## 2025-01-21 ENCOUNTER — DOCUMENTATION (OUTPATIENT)
Dept: PRIMARY CARE | Facility: CLINIC | Age: 70
End: 2025-01-21
Payer: MEDICARE

## 2025-01-22 ENCOUNTER — APPOINTMENT (OUTPATIENT)
Dept: OPHTHALMOLOGY | Age: 70
End: 2025-01-22
Payer: MEDICARE

## 2025-01-22 DIAGNOSIS — H25.812 COMBINED FORM OF AGE-RELATED CATARACT, LEFT EYE: ICD-10-CM

## 2025-01-22 DIAGNOSIS — H43.813 PVD (POSTERIOR VITREOUS DETACHMENT), BOTH EYES: ICD-10-CM

## 2025-01-22 DIAGNOSIS — H35.3132 INTERMEDIATE STAGE NONEXUDATIVE AGE-RELATED MACULAR DEGENERATION OF BOTH EYES: Primary | ICD-10-CM

## 2025-01-22 DIAGNOSIS — H26.491 PCO (POSTERIOR CAPSULAR OPACIFICATION), RIGHT: ICD-10-CM

## 2025-01-22 DIAGNOSIS — H52.03 HYPEROPIA, BILATERAL: ICD-10-CM

## 2025-01-22 DIAGNOSIS — Z96.1 PSEUDOPHAKIA: ICD-10-CM

## 2025-01-22 DIAGNOSIS — H52.4 PRESBYOPIA: ICD-10-CM

## 2025-02-13 ENCOUNTER — APPOINTMENT (OUTPATIENT)
Dept: PRIMARY CARE | Facility: CLINIC | Age: 70
End: 2025-02-13
Payer: MEDICARE

## 2025-03-05 ENCOUNTER — APPOINTMENT (OUTPATIENT)
Dept: OPHTHALMOLOGY | Age: 70
End: 2025-03-05
Payer: MEDICARE

## 2025-03-06 ENCOUNTER — OFFICE VISIT (OUTPATIENT)
Dept: URGENT CARE | Age: 70
End: 2025-03-06
Payer: MEDICARE

## 2025-03-06 VITALS
DIASTOLIC BLOOD PRESSURE: 70 MMHG | WEIGHT: 140.8 LBS | TEMPERATURE: 97.7 F | OXYGEN SATURATION: 98 % | RESPIRATION RATE: 16 BRPM | BODY MASS INDEX: 22.73 KG/M2 | SYSTOLIC BLOOD PRESSURE: 127 MMHG | HEART RATE: 67 BPM

## 2025-03-06 DIAGNOSIS — M25.511 CHRONIC RIGHT SHOULDER PAIN: Primary | ICD-10-CM

## 2025-03-06 DIAGNOSIS — G89.29 CHRONIC RIGHT SHOULDER PAIN: Primary | ICD-10-CM

## 2025-03-06 PROCEDURE — 1123F ACP DISCUSS/DSCN MKR DOCD: CPT | Performed by: NURSE PRACTITIONER

## 2025-03-06 PROCEDURE — 1159F MED LIST DOCD IN RCRD: CPT | Performed by: NURSE PRACTITIONER

## 2025-03-06 PROCEDURE — 1125F AMNT PAIN NOTED PAIN PRSNT: CPT | Performed by: NURSE PRACTITIONER

## 2025-03-06 PROCEDURE — 99213 OFFICE O/P EST LOW 20 MIN: CPT | Performed by: NURSE PRACTITIONER

## 2025-03-06 PROCEDURE — 4004F PT TOBACCO SCREEN RCVD TLK: CPT | Performed by: NURSE PRACTITIONER

## 2025-03-06 RX ORDER — METHOCARBAMOL 500 MG/1
500 TABLET, FILM COATED ORAL 4 TIMES DAILY PRN
Qty: 40 TABLET | Refills: 0 | Status: SHIPPED | OUTPATIENT
Start: 2025-03-06 | End: 2025-05-05

## 2025-03-06 RX ORDER — PREDNISONE 20 MG/1
40 TABLET ORAL DAILY
Qty: 10 TABLET | Refills: 0 | Status: SHIPPED | OUTPATIENT
Start: 2025-03-06 | End: 2025-03-11

## 2025-03-06 ASSESSMENT — PAIN SCALES - GENERAL: PAINLEVEL_OUTOF10: 10-WORST PAIN EVER

## 2025-03-06 NOTE — PROGRESS NOTES
"Subjective   Patient ID: Julissa New \"Kelly" is a 69 y.o. female. They present today with a chief complaint of Other (Right shoulder pain times 1.5 weeks. Denies any new injuries.).    History of Present Illness  70 yo female coming in for right shoulder pain. She states she has arthritis in all of her joints and sometimes the pain flares. She states for the last 1 1/2 weeks she has had achy pain in the right shoulder. She denies any injury. She denies any other complaints today.     Past Medical History  Allergies as of 03/06/2025 - Reviewed 03/06/2025   Allergen Reaction Noted    Penicillins Anaphylaxis 03/14/2023    Tetanus immune globulin Anaphylaxis 03/14/2023    Tetanus toxoid Anaphylaxis 03/14/2023    Tetanus vaccines and toxoid Anaphylaxis 03/14/2023    Levofloxacin Swelling 03/14/2023    Erythromycin Rash 03/14/2023    Sulfa (sulfonamide antibiotics) Rash 03/14/2023    Sulfamethoxazole-trimethoprim Rash 03/29/2018    Tetracyclines Rash 03/14/2023       (Not in a hospital admission)       Past Medical History:   Diagnosis Date    Furuncle of groin 08/23/2022    Boil, groin    Hypertension     Infection and inflammatory reaction due to other internal joint prosthesis, initial encounter ([facility]) 08/23/2022    Chronic infection of knee joint prosthesis, initial encounter    Left sided colitis without complications ([facility]) 10/03/2017    Ulcerative colitis, left sided    Macular degeneration     PCO (posterior capsular opacification), right     Personal history of (healed) traumatic fracture 08/23/2022    History of compression fracture of spine    Personal history of other diseases of the circulatory system 08/23/2022    History of hypertension    Personal history of other diseases of the respiratory system 08/23/2022    History of acute sinusitis    Personal history of other diseases of urinary system 08/23/2022    History of hematuria    Personal history of transient ischemic attack (TIA), and cerebral " infarction without residual deficits 08/23/2022    History of cerebrovascular accident    PVD (posterior vitreous detachment), both eyes     Unspecified cataract 08/23/2022    Cataracts, both eyes    Urinary tract infection, site not specified 08/23/2022    Acute UTI    Vitreous degeneration, left eye 08/23/2022    PVD (posterior vitreous detachment), left eye       Past Surgical History:   Procedure Laterality Date    CT HEAD ANGIO W AND WO IV CONTRAST  1/21/2019    CT HEAD ANGIO W AND WO IV CONTRAST 1/21/2019 [facility] ANCILLARY LEGACY    CT HEAD ANGIO W AND WO IV CONTRAST  6/4/2020    CT HEAD ANGIO W AND WO IV CONTRAST 6/4/2020 [facility]    MR HEAD ANGIO WO IV CONTRAST  6/3/2018    MR HEAD ANGIO WO IV CONTRAST 6/3/2018 [email or web address] CLINICAL LEGACY    MR HEAD ANGIO WO IV CONTRAST  8/18/2022    MR HEAD ANGIO WO IV CONTRAST 8/18/2022 [facility]CILLARY [facility]GAC[facility]    MR NECK ANGIO WO IV CONTRAST  6/3/2018    MR NECK ANGIO WO IV CONTRAST 6/3/2018 [facility] [email or web address] CLINICAL LEGACY    OTHER SURGICAL HISTORY  08/23/2022    Appendectomy    OTHER SURGICAL HISTORY  08/23/2022    Back surgery    OTHER SURGICAL HISTORY  08/23/2022    Hip replacement    OTHER SURGICAL HISTORY  08/23/2022    Knee replacement    OTHER SURGICAL HISTORY  08/23/2022    Hand surgery        reports that she has been smoking cigarettes. She has a 50 pack-year smoking history. She has never used smokeless tobacco. She reports current alcohol use. She reports that she does not use drugs.    Review of Systems  Review of Systems:  General: No weight loss, fatigue, anorexia, insomnia, fever, chills.  Cardiac: No chest pain, palpitations, syncope, near syncope.  Pulmonary:  No shortness of breath, cough, hemoptysis  Heme/lymph: No swollen glands, fever, bleeding  Musculoskeletal: No limb pain, Positive right shoulder pain  Skin: No rashes  Neuro: No numbness, tingling, headaches                                 Objective    Vitals:    03/06/25 1749   BP: 127/70   Pulse: 67   Resp:  16   Temp: 36.5 °C (97.7 °F)   SpO2: 98%   Weight: 63.9 kg (140 lb 12.8 oz)     No LMP recorded. Patient is postmenopausal.    Physical Exam  Shoulder Injury:  Appearance: Alert, oriented, cooperative, in no acute distress. Well-nourished and well hydrated.   Skin: Intact, dry skin, no lesions, rash, petechiae or purpura  Musculoskeletal: No peripheral edema. Exam of the right shoulder shows no deformity, edema, or erythema. There is pain on palpation to the glenohumeral joint. There is decreased active range of motion. Passive range of motion is slightly improved over active range of motion. Patient has strong equal hand grasps bilaterally, is neurovascularly intact. Skin is intact.      Procedures    Point of Care Test & Imaging Results from this visit  No results found for this visit on 03/06/25.   No results found.    Diagnostic study results (if any) were reviewed by MIRELA Carrington.    Assessment/Plan   Allergies, medications, history, and pertinent labs/EKGs/Imaging reviewed by MIRELA Carrington.     Medical Decision Making  Treatment: methocarbamol and prednisone prescribed  Differential: 1) shoulder strain , 2) shoulder bursitis, 3) arthritis of shoulder  Plan: Patient will follow up with the PCP in the next 2-3 days. Return for any worsening symptoms or go to the ER for further evaluation. Patient understands return precautions and discharge insturctions.  Impression:   1) chronic right shoulder pain      Orders and Diagnoses  Diagnoses and all orders for this visit:  Chronic right shoulder pain  -     methocarbamol (Robaxin) 500 mg tablet; Take 1 tablet (500 mg) by mouth 4 times a day as needed for muscle spasms.  -     predniSONE (Deltasone) 20 mg tablet; Take 2 tablets (40 mg) by mouth once daily for 5 days.      Medical Admin Record      Patient disposition: Home    Electronically signed by MIRELA Carrington  6:06 PM

## 2025-03-25 ENCOUNTER — APPOINTMENT (OUTPATIENT)
Dept: PRIMARY CARE | Facility: CLINIC | Age: 70
End: 2025-03-25
Payer: MEDICARE

## 2025-03-25 ENCOUNTER — DOCUMENTATION (OUTPATIENT)
Dept: PRIMARY CARE | Facility: CLINIC | Age: 70
End: 2025-03-25

## 2025-03-25 VITALS
SYSTOLIC BLOOD PRESSURE: 118 MMHG | HEIGHT: 66 IN | OXYGEN SATURATION: 96 % | WEIGHT: 144.4 LBS | DIASTOLIC BLOOD PRESSURE: 70 MMHG | HEART RATE: 83 BPM | BODY MASS INDEX: 23.21 KG/M2

## 2025-03-25 DIAGNOSIS — E78.2 MIXED HYPERLIPIDEMIA: ICD-10-CM

## 2025-03-25 DIAGNOSIS — R53.83 OTHER FATIGUE: ICD-10-CM

## 2025-03-25 DIAGNOSIS — Z00.00 ANNUAL PHYSICAL EXAM: Primary | ICD-10-CM

## 2025-03-25 DIAGNOSIS — E03.9 ADULT HYPOTHYROIDISM: ICD-10-CM

## 2025-03-25 DIAGNOSIS — F33.1 MODERATE EPISODE OF RECURRENT MAJOR DEPRESSIVE DISORDER: ICD-10-CM

## 2025-03-25 PROCEDURE — 99214 OFFICE O/P EST MOD 30 MIN: CPT | Performed by: FAMILY MEDICINE

## 2025-03-25 PROCEDURE — 1160F RVW MEDS BY RX/DR IN RCRD: CPT | Performed by: FAMILY MEDICINE

## 2025-03-25 PROCEDURE — G2211 COMPLEX E/M VISIT ADD ON: HCPCS | Performed by: FAMILY MEDICINE

## 2025-03-25 PROCEDURE — 3008F BODY MASS INDEX DOCD: CPT | Performed by: FAMILY MEDICINE

## 2025-03-25 PROCEDURE — 1123F ACP DISCUSS/DSCN MKR DOCD: CPT | Performed by: FAMILY MEDICINE

## 2025-03-25 PROCEDURE — 1159F MED LIST DOCD IN RCRD: CPT | Performed by: FAMILY MEDICINE

## 2025-03-25 PROCEDURE — 4004F PT TOBACCO SCREEN RCVD TLK: CPT | Performed by: FAMILY MEDICINE

## 2025-03-25 ASSESSMENT — ENCOUNTER SYMPTOMS
OCCASIONAL FEELINGS OF UNSTEADINESS: 0
DEPRESSION: 0
LOSS OF SENSATION IN FEET: 0

## 2025-03-25 ASSESSMENT — ANXIETY QUESTIONNAIRES
6. BECOMING EASILY ANNOYED OR IRRITABLE: NEARLY EVERY DAY
3. WORRYING TOO MUCH ABOUT DIFFERENT THINGS: MORE THAN HALF THE DAYS
4. TROUBLE RELAXING: MORE THAN HALF THE DAYS
GAD7 TOTAL SCORE: 13
7. FEELING AFRAID AS IF SOMETHING AWFUL MIGHT HAPPEN: NOT AT ALL
1. FEELING NERVOUS, ANXIOUS, OR ON EDGE: NEARLY EVERY DAY
2. NOT BEING ABLE TO STOP OR CONTROL WORRYING: MORE THAN HALF THE DAYS
IF YOU CHECKED OFF ANY PROBLEMS ON THIS QUESTIONNAIRE, HOW DIFFICULT HAVE THESE PROBLEMS MADE IT FOR YOU TO DO YOUR WORK, TAKE CARE OF THINGS AT HOME, OR GET ALONG WITH OTHER PEOPLE: SOMEWHAT DIFFICULT
5. BEING SO RESTLESS THAT IT IS HARD TO SIT STILL: SEVERAL DAYS

## 2025-03-25 ASSESSMENT — PATIENT HEALTH QUESTIONNAIRE - PHQ9
7. TROUBLE CONCENTRATING ON THINGS, SUCH AS READING THE NEWSPAPER OR WATCHING TELEVISION: MORE THAN HALF THE DAYS
3. TROUBLE FALLING OR STAYING ASLEEP OR SLEEPING TOO MUCH: MORE THAN HALF THE DAYS
10. IF YOU CHECKED OFF ANY PROBLEMS, HOW DIFFICULT HAVE THESE PROBLEMS MADE IT FOR YOU TO DO YOUR WORK, TAKE CARE OF THINGS AT HOME, OR GET ALONG WITH OTHER PEOPLE: SOMEWHAT DIFFICULT
6. FEELING BAD ABOUT YOURSELF - OR THAT YOU ARE A FAILURE OR HAVE LET YOURSELF OR YOUR FAMILY DOWN: MORE THAN HALF THE DAYS
8. MOVING OR SPEAKING SO SLOWLY THAT OTHER PEOPLE COULD HAVE NOTICED. OR THE OPPOSITE, BEING SO FIGETY OR RESTLESS THAT YOU HAVE BEEN MOVING AROUND A LOT MORE THAN USUAL: NOT AT ALL
9. THOUGHTS THAT YOU WOULD BE BETTER OFF DEAD, OR OF HURTING YOURSELF: SEVERAL DAYS
5. POOR APPETITE OR OVEREATING: MORE THAN HALF THE DAYS
1. LITTLE INTEREST OR PLEASURE IN DOING THINGS: MORE THAN HALF THE DAYS
2. FEELING DOWN, DEPRESSED OR HOPELESS: MORE THAN HALF THE DAYS
SUM OF ALL RESPONSES TO PHQ QUESTIONS 1-9: 15
SUM OF ALL RESPONSES TO PHQ9 QUESTIONS 1 AND 2: 4
4. FEELING TIRED OR HAVING LITTLE ENERGY: MORE THAN HALF THE DAYS

## 2025-03-25 NOTE — PROGRESS NOTES
Pt has been identified for Chronic Care Management. Will attempt to reach pt for enrollment within 3 business days.

## 2025-03-25 NOTE — ASSESSMENT & PLAN NOTE
Encouraging to resume her Atorvastatin - reviewed risks and benefits of treatment - goal of LDL <70 recommended.  Recheck fasting labs.  Orders:    Comprehensive Metabolic Panel; Future    Lipid Panel; Future

## 2025-03-25 NOTE — PROGRESS NOTES
"Subjective   Patient ID: Julissa New \"Lisa\" is a 69 y.o. female who presents for Follow-up.    HPI    FATIGUE: feeling exhausted all the time  Not sleeping well at all  Does not feel rested - she stays up night since it is quiet at night  Previously dx with VERO - she dos not have her CPAP machine any longer.  She is not interested in repeat sleep study at this time.    HYPOTHYROID: not taking medications currently  Weight has stabilized.    HYPERLIPIDEMIA:  Patient had been tolerating regular Atorvastatin (Lipitor) dosing without muscle achiness or weakness.  Review of last fasting lipids with far control but not at goal of LDL <70 with history of TIA and suspected CVA.  Lab Results   Component Value Date    CHOL 175 08/19/2024     Lab Results   Component Value Date    HDL 46.1 08/19/2024     Lab Results   Component Value Date    LDLCALC 110 (H) 08/19/2024     Lab Results   Component Value Date    TRIG 94 08/19/2024     No components found for: \"CHOLHDL\"       OSTEOPOROSIS: tolerating Boniva without side effects  === 01/17/24 ===    DEXA BONE DENSITY    - Impression -  DEXA:  According to World Health Organization criteria,  classification is osteoporosis.    Followup recommended in two years or sooner as clinically warranted.    All images and detailed analysis are available on the  Radiology  PACS.    MACRO:  None  Signed by: Simona Little 1/17/2024 10:39 AM  Dictation workstation:   [id number]    DEPRESSION & ANXIETY:  not currently taking Escitalopram and Bupropion regularly  Not currently in counseling - she is willing to reconnect with our Behavioral Health Collaborative Care Team   She has had long standing depression and has been on combination fo SSRI and SNRI for multiple years on and off.  Patient Health Questionnaire-9 Score: 15  DICK-7 Total Score: 13     Review of Systems    Review of Systems negative except as noted in HPI and Chief complaint.     Objective   Patient Health Questionnaire-9 " "Score: 15  DICK-7 Total Score: 13     VITALS:  /70 (BP Location: Left arm, Patient Position: Sitting, BP Cuff Size: Adult)   Pulse 83   Ht 1.676 m (5' 6\")   Wt 65.5 kg (144 lb 6.4 oz)   SpO2 96%   BMI 23.31 kg/m²      Physical Exam  Constitutional:       General: She is not in acute distress.     Appearance: Normal appearance. She is not ill-appearing.      Comments: Color improved - cheeks pinker than last visit.   HENT:      Head: Normocephalic and atraumatic.   Neck:      Vascular: No carotid bruit.   Cardiovascular:      Rate and Rhythm: Normal rate and regular rhythm.      Pulses: Normal pulses.      Heart sounds: Normal heart sounds. No murmur heard.     No gallop.   Pulmonary:      Effort: Pulmonary effort is normal.      Breath sounds: Normal breath sounds. No wheezing, rhonchi or rales.   Musculoskeletal:      Cervical back: Normal range of motion and neck supple. No rigidity or tenderness.   Lymphadenopathy:      Cervical: No cervical adenopathy.   Skin:     General: Skin is warm and dry.   Neurological:      Mental Status: She is alert.   Psychiatric:         Mood and Affect: Mood normal.         Behavior: Behavior normal.         Assessment/Plan   Assessment & Plan  Annual physical exam         Mixed hyperlipidemia  Encouraging to resume her Atorvastatin - reviewed risks and benefits of treatment - goal of LDL <70 recommended.  Recheck fasting labs.  Orders:    Comprehensive Metabolic Panel; Future    Lipid Panel; Future    Adult hypothyroidism  Controlled on last labs - recheck labs after resuming medications for 8-12 weeks.  Orders:    TSH with reflex to Free T4 if abnormal; Future    Other fatigue  Re-checking cbc - will call with results.  Orders:    CBC; Future    Moderate episode of recurrent major depressive disorder  I have discussed the collaborative care model for this patient's behavioral health care.  Written detailed information and identifying the members of this care team was " provided to patient.  They give permission for the Behavioral Health Manager (BHM) and psychiatric consultant to be included in their care with my continued primary management.  Patient made aware that services provided as part of the Collaborative Care Model are subject to insurance billing.  She is willing to try again.  Orders:    Follow Up In Advanced Primary Care - Behavioral Health Collaborative Care CoCM; Future      CHRONIC CARE MANAGEMENT ENROLLMENT VISIT  Patient meets criteria for enrollment in Chronic Care Management - has 2 chronic conditions: hyperlipidemia, depression, h/o CVA, hypothyroidism  I have discussed the nature and availability of the program with the patient, the patient's responsibility for the applicable cost sharing, as well as limitation that only one practitioner furnishing services can be paid during a calendar month, and their right to stop services at any time effective the end of the month.    The patient has agreed to enroll.   Effective on today's date.  I initiated a care plan with them that outlines goals that will be achieved with care coordination activities.      FOLLOW UP 3 MONTHS, SOONER WITH ANY PROBLEMS OR CONCERNS.

## 2025-03-25 NOTE — ASSESSMENT & PLAN NOTE
I have discussed the collaborative care model for this patient's behavioral health care.  Written detailed information and identifying the members of this care team was provided to patient.  They give permission for the Behavioral Health Manager (BHM) and psychiatric consultant to be included in their care with my continued primary management.  Patient made aware that services provided as part of the Collaborative Care Model are subject to insurance billing.  She is willing to try again.  Orders:    Follow Up In Advanced Primary Care - Behavioral Health Collaborative Care CoCM; Future

## 2025-03-26 LAB
ERYTHROCYTE [DISTWIDTH] IN BLOOD BY AUTOMATED COUNT: 13.9 % (ref 11–15)
HCT VFR BLD AUTO: 42.5 % (ref 35–45)
HGB BLD-MCNC: 13.8 G/DL (ref 11.7–15.5)
MCH RBC QN AUTO: 30.9 PG (ref 27–33)
MCHC RBC AUTO-ENTMCNC: 32.5 G/DL (ref 32–36)
MCV RBC AUTO: 95.1 FL (ref 80–100)
PLATELET # BLD AUTO: 246 THOUSAND/UL (ref 140–400)
PMV BLD REES-ECKER: 9.9 FL (ref 7.5–12.5)
RBC # BLD AUTO: 4.47 MILLION/UL (ref 3.8–5.1)
TSH SERPL-ACNC: 2.77 MIU/L (ref 0.4–4.5)
WBC # BLD AUTO: 7.4 THOUSAND/UL (ref 3.8–10.8)

## 2025-04-09 ENCOUNTER — APPOINTMENT (OUTPATIENT)
Dept: PRIMARY CARE | Facility: CLINIC | Age: 70
End: 2025-04-09
Payer: MEDICARE

## 2025-04-09 ENCOUNTER — PATIENT OUTREACH (OUTPATIENT)
Dept: PRIMARY CARE | Facility: CLINIC | Age: 70
End: 2025-04-09

## 2025-04-09 DIAGNOSIS — E03.9 ADULT HYPOTHYROIDISM: ICD-10-CM

## 2025-04-09 DIAGNOSIS — R53.83 OTHER FATIGUE: ICD-10-CM

## 2025-04-09 DIAGNOSIS — F33.1 MODERATE EPISODE OF RECURRENT MAJOR DEPRESSIVE DISORDER: ICD-10-CM

## 2025-04-09 ASSESSMENT — PATIENT HEALTH QUESTIONNAIRE - PHQ9
7. TROUBLE CONCENTRATING ON THINGS, SUCH AS READING THE NEWSPAPER OR WATCHING TELEVISION: MORE THAN HALF THE DAYS
2. FEELING DOWN, DEPRESSED OR HOPELESS: MORE THAN HALF THE DAYS
8. MOVING OR SPEAKING SO SLOWLY THAT OTHER PEOPLE COULD HAVE NOTICED. OR THE OPPOSITE, BEING SO FIGETY OR RESTLESS THAT YOU HAVE BEEN MOVING AROUND A LOT MORE THAN USUAL: NOT AT ALL
SUM OF ALL RESPONSES TO PHQ QUESTIONS 1-9: 14
3. TROUBLE FALLING OR STAYING ASLEEP: SEVERAL DAYS
10. IF YOU CHECKED OFF ANY PROBLEMS, HOW DIFFICULT HAVE THESE PROBLEMS MADE IT FOR YOU TO DO YOUR WORK, TAKE CARE OF THINGS AT HOME, OR GET ALONG WITH OTHER PEOPLE: SOMEWHAT DIFFICULT
5. POOR APPETITE OR OVEREATING: MORE THAN HALF THE DAYS
4. FEELING TIRED OR HAVING LITTLE ENERGY: MORE THAN HALF THE DAYS
1. LITTLE INTEREST OR PLEASURE IN DOING THINGS: MORE THAN HALF THE DAYS
9. THOUGHTS THAT YOU WOULD BE BETTER OFF DEAD, OR OF HURTING YOURSELF: SEVERAL DAYS
SUM OF ALL RESPONSES TO PHQ9 QUESTIONS 1 & 2: 4
6. FEELING BAD ABOUT YOURSELF - OR THAT YOU ARE A FAILURE OR HAVE LET YOURSELF OR YOUR FAMILY DOWN: MORE THAN HALF THE DAYS

## 2025-04-09 ASSESSMENT — ANXIETY QUESTIONNAIRES
1. FEELING NERVOUS, ANXIOUS, OR ON EDGE: MORE THAN HALF THE DAYS
IF YOU CHECKED OFF ANY PROBLEMS ON THIS QUESTIONNAIRE, HOW DIFFICULT HAVE THESE PROBLEMS MADE IT FOR YOU TO DO YOUR WORK, TAKE CARE OF THINGS AT HOME, OR GET ALONG WITH OTHER PEOPLE: SOMEWHAT DIFFICULT
5. BEING SO RESTLESS THAT IT IS HARD TO SIT STILL: NOT AT ALL
4. TROUBLE RELAXING: MORE THAN HALF THE DAYS
2. NOT BEING ABLE TO STOP OR CONTROL WORRYING: MORE THAN HALF THE DAYS
6. BECOMING EASILY ANNOYED OR IRRITABLE: NEARLY EVERY DAY
7. FEELING AFRAID AS IF SOMETHING AWFUL MIGHT HAPPEN: MORE THAN HALF THE DAYS
3. WORRYING TOO MUCH ABOUT DIFFERENT THINGS: MORE THAN HALF THE DAYS
GAD7 TOTAL SCORE: 13
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
## 2025-04-17 ENCOUNTER — APPOINTMENT (OUTPATIENT)
Dept: PRIMARY CARE | Facility: CLINIC | Age: 70
End: 2025-04-17
Payer: MEDICARE

## 2025-04-17 ENCOUNTER — DOCUMENTATION (OUTPATIENT)
Dept: BEHAVIORAL HEALTH | Facility: HOSPITAL | Age: 70
End: 2025-04-17
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
## 2025-04-30 ENCOUNTER — DOCUMENTATION (OUTPATIENT)
Dept: PRIMARY CARE | Facility: CLINIC | Age: 70
End: 2025-04-30
Payer: MEDICARE

## 2025-04-30 DIAGNOSIS — F33.2 SEVERE EPISODE OF RECURRENT MAJOR DEPRESSIVE DISORDER, WITHOUT PSYCHOTIC FEATURES (MULTI): Primary | ICD-10-CM

## 2025-05-06 ENCOUNTER — TELEPHONE (OUTPATIENT)
Dept: PRIMARY CARE | Facility: CLINIC | Age: 70
End: 2025-05-06
Payer: MEDICARE

## 2025-05-12 ENCOUNTER — TELEPHONE (OUTPATIENT)
Dept: PRIMARY CARE | Facility: CLINIC | Age: 70
End: 2025-05-12
Payer: MEDICARE

## 2025-05-13 ENCOUNTER — PATIENT OUTREACH (OUTPATIENT)
Dept: PRIMARY CARE | Facility: CLINIC | Age: 70
End: 2025-05-13
Payer: MEDICARE

## 2025-05-15 ENCOUNTER — TELEPHONE (OUTPATIENT)
Dept: PRIMARY CARE | Facility: CLINIC | Age: 70
End: 2025-05-15
Payer: MEDICARE

## 2025-05-30 ENCOUNTER — DOCUMENTATION (OUTPATIENT)
Dept: PRIMARY CARE | Facility: CLINIC | Age: 70
End: 2025-05-30
Payer: MEDICARE

## 2025-05-30 NOTE — PROGRESS NOTES
Pt dis-enrolled from [facility]. Unable to reach pt x 3 attempts. Letter sent via [employer].

## 2025-05-30 NOTE — PROGRESS NOTES
This patient has been enrolled in [facility] for management and support of depression and/or anxiety.  [facility] has attempted to reach patient on several occasions without success.  At this time, patient has not contacted [facility], thus [facility] will discharge patient from [facility] and end episode of care.  If PCP feels patient would benefit from [facility] in the future, a new referral can be placed.

## 2025-06-11 ENCOUNTER — APPOINTMENT (OUTPATIENT)
Dept: OPHTHALMOLOGY | Age: 70
End: 2025-06-11
Payer: MEDICARE

## 2025-06-17 DIAGNOSIS — E78.2 MIXED HYPERLIPIDEMIA: ICD-10-CM

## 2025-07-11 ENCOUNTER — TELEPHONE (OUTPATIENT)
Dept: PRIMARY CARE | Facility: CLINIC | Age: 70
End: 2025-07-11
Payer: MEDICARE

## 2025-07-11 ENCOUNTER — DOCUMENTATION (OUTPATIENT)
Dept: PRIMARY CARE | Facility: CLINIC | Age: 70
End: 2025-07-11
Payer: MEDICARE

## 2025-07-11 NOTE — PROGRESS NOTES
PCP asked [facility] to contact patient to check in, as pt spoke with Mona and it was reported that patient is 'not happy' right now.  BHM left VM encouraging patient to contact [facility] to check in.

## 2025-07-11 NOTE — PROGRESS NOTES
"Pt called my office line and requested an appointment with Dr. Mitchell. When I asked what she wanted to be seen for, she stated \"I'm not a happy person\". I asked if she was thinking about or wanting to harm herself, and she said \"no\". I put her on hold while I went to look for Dr. Mitchell or Megan Zendejas who was assisting her today and neither were available. When I came back to my office, Lisa was no longer on hold and appeared to have hung up. I called her back twice with no answer. I left a voicemail requesting a return phone call. No response at this time. I went to Dr. Mitchell's office several minutes later at which time she was available. She stated that she did not have any open spots for her today and she asked DANGELO Mcdowell to call her.   "

## 2025-07-11 NOTE — PROGRESS NOTES
"Pt called in asking for an appointment with Dr. Mitchell because she's \"not a happy person\". When I asked if she was thinking about harming herself, she stated \"no\".   "

## 2025-10-02 ENCOUNTER — APPOINTMENT (OUTPATIENT)
Dept: PRIMARY CARE | Facility: CLINIC | Age: 70
End: 2025-10-02
Payer: MEDICARE